# Patient Record
Sex: FEMALE | Race: BLACK OR AFRICAN AMERICAN | Employment: FULL TIME | URBAN - METROPOLITAN AREA
[De-identification: names, ages, dates, MRNs, and addresses within clinical notes are randomized per-mention and may not be internally consistent; named-entity substitution may affect disease eponyms.]

---

## 2018-12-27 ENCOUNTER — COV DENTAL ENCOUNTER (OUTPATIENT)
Dept: CT IMAGING | Age: 28
DRG: 064 | End: 2018-12-27
Attending: NEUROLOGICAL SURGERY
Payer: SUBSIDIZED

## 2018-12-27 ENCOUNTER — APPOINTMENT (OUTPATIENT)
Dept: CT IMAGING | Age: 28
End: 2018-12-27
Attending: NURSE PRACTITIONER
Payer: SUBSIDIZED

## 2018-12-27 ENCOUNTER — HOSPITAL ENCOUNTER (INPATIENT)
Age: 28
LOS: 6 days | Discharge: HOME | DRG: 064 | End: 2019-01-02
Attending: EMERGENCY MEDICINE | Admitting: FAMILY MEDICINE
Payer: SUBSIDIZED

## 2018-12-27 ENCOUNTER — HOSPITAL ENCOUNTER (EMERGENCY)
Age: 28
Discharge: HOSPICE | End: 2018-12-27
Attending: EMERGENCY MEDICINE
Payer: SUBSIDIZED

## 2018-12-27 ENCOUNTER — APPOINTMENT (OUTPATIENT)
Dept: MRI IMAGING | Age: 28
DRG: 064 | End: 2018-12-27
Attending: FAMILY MEDICINE
Payer: SUBSIDIZED

## 2018-12-27 VITALS
BODY MASS INDEX: 31.58 KG/M2 | DIASTOLIC BLOOD PRESSURE: 55 MMHG | TEMPERATURE: 98.8 F | SYSTOLIC BLOOD PRESSURE: 119 MMHG | OXYGEN SATURATION: 99 % | RESPIRATION RATE: 19 BRPM | WEIGHT: 185 LBS | HEART RATE: 65 BPM | HEIGHT: 64 IN

## 2018-12-27 DIAGNOSIS — I62.9 INTRACRANIAL BLEED (HCC): ICD-10-CM

## 2018-12-27 DIAGNOSIS — I61.5: Primary | ICD-10-CM

## 2018-12-27 DIAGNOSIS — I61.9 CEREBRAL BRAIN HEMORRHAGE (HCC): Primary | ICD-10-CM

## 2018-12-27 LAB
ALBUMIN SERPL-MCNC: 3.4 G/DL (ref 3.5–5)
ALBUMIN/GLOB SERPL: 0.9 {RATIO}
ALP SERPL-CCNC: 88 U/L (ref 50–130)
ALT SERPL-CCNC: 121 U/L (ref 12–65)
AMPHET UR QL SCN: NEGATIVE
ANION GAP SERPL CALC-SCNC: 9 MMOL/L
AST SERPL-CCNC: 245 U/L (ref 15–37)
ATRIAL RATE: 58 BPM
BACTERIA URNS QL MICRO: NORMAL /HPF
BARBITURATES UR QL SCN: NEGATIVE
BASOPHILS # BLD: 0 K/UL (ref 0–0.2)
BASOPHILS NFR BLD: 0 % (ref 0–2)
BENZODIAZ UR QL: NEGATIVE
BILIRUB SERPL-MCNC: 0.6 MG/DL (ref 0.2–1.1)
BUN SERPL-MCNC: 9 MG/DL (ref 6–23)
CALCIUM SERPL-MCNC: 8.9 MG/DL (ref 8.3–10.4)
CALCULATED P AXIS, ECG09: 56 DEGREES
CALCULATED R AXIS, ECG10: 69 DEGREES
CALCULATED T AXIS, ECG11: 49 DEGREES
CANNABINOIDS UR QL SCN: POSITIVE
CASTS URNS QL MICRO: 0 /LPF
CHLORIDE SERPL-SCNC: 104 MMOL/L (ref 98–107)
CO2 SERPL-SCNC: 26 MMOL/L (ref 21–32)
COCAINE UR QL SCN: NEGATIVE
CREAT SERPL-MCNC: 0.96 MG/DL (ref 0.6–1)
CRYSTALS URNS QL MICRO: 0 /LPF
DIAGNOSIS, 93000: NORMAL
DIFFERENTIAL METHOD BLD: ABNORMAL
EOSINOPHIL # BLD: 0.1 K/UL (ref 0–0.8)
EOSINOPHIL NFR BLD: 2 % (ref 0.5–7.8)
EPI CELLS #/AREA URNS HPF: NORMAL /HPF
ERYTHROCYTE [DISTWIDTH] IN BLOOD BY AUTOMATED COUNT: 12 % (ref 11.9–14.6)
GLOBULIN SER CALC-MCNC: 3.9 G/DL (ref 2.3–3.5)
GLUCOSE SERPL-MCNC: 86 MG/DL (ref 65–100)
HCG UR QL: NEGATIVE
HCT VFR BLD AUTO: 42 % (ref 35.8–46.3)
HGB BLD-MCNC: 13.9 G/DL (ref 11.7–15.4)
IMM GRANULOCYTES # BLD: 0 K/UL (ref 0–0.5)
IMM GRANULOCYTES NFR BLD AUTO: 0 % (ref 0–5)
LYMPHOCYTES # BLD: 1.1 K/UL (ref 0.5–4.6)
LYMPHOCYTES NFR BLD: 30 % (ref 13–44)
MCH RBC QN AUTO: 30.8 PG (ref 26.1–32.9)
MCHC RBC AUTO-ENTMCNC: 33.1 G/DL (ref 31.4–35)
MCV RBC AUTO: 93.1 FL (ref 79.6–97.8)
METHADONE UR QL: NEGATIVE
MONOCYTES # BLD: 0.3 K/UL (ref 0.1–1.3)
MONOCYTES NFR BLD: 9 % (ref 4–12)
MUCOUS THREADS URNS QL MICRO: 0 /LPF
NEUTS SEG # BLD: 2.1 K/UL (ref 1.7–8.2)
NEUTS SEG NFR BLD: 59 % (ref 43–78)
NRBC # BLD: 0 K/UL (ref 0–0.2)
OPIATES UR QL: NEGATIVE
P-R INTERVAL, ECG05: 144 MS
PCP UR QL: NEGATIVE
PLATELET # BLD AUTO: 175 K/UL (ref 150–450)
PMV BLD AUTO: 10.8 FL (ref 9.4–12.3)
POTASSIUM SERPL-SCNC: 3.6 MMOL/L (ref 3.5–5.1)
PROT SERPL-MCNC: 7.3 G/DL
Q-T INTERVAL, ECG07: 402 MS
QRS DURATION, ECG06: 86 MS
QTC CALCULATION (BEZET), ECG08: 394 MS
RBC # BLD AUTO: 4.51 M/UL (ref 4.05–5.2)
RBC #/AREA URNS HPF: NORMAL /HPF
SODIUM SERPL-SCNC: 139 MMOL/L (ref 136–145)
TRICHOMONAS UR QL MICRO: NORMAL
VENTRICULAR RATE, ECG03: 58 BPM
WBC # BLD AUTO: 3.6 K/UL (ref 4.3–11.1)
WBC URNS QL MICRO: NORMAL /HPF

## 2018-12-27 PROCEDURE — 74011250636 HC RX REV CODE- 250/636: Performed by: NURSE PRACTITIONER

## 2018-12-27 PROCEDURE — 70553 MRI BRAIN STEM W/O & W/DYE: CPT

## 2018-12-27 PROCEDURE — 74011250636 HC RX REV CODE- 250/636: Performed by: EMERGENCY MEDICINE

## 2018-12-27 PROCEDURE — 99285 EMERGENCY DEPT VISIT HI MDM: CPT | Performed by: EMERGENCY MEDICINE

## 2018-12-27 PROCEDURE — 70450 CT HEAD/BRAIN W/O DYE: CPT

## 2018-12-27 PROCEDURE — 85025 COMPLETE CBC W/AUTO DIFF WBC: CPT

## 2018-12-27 PROCEDURE — 96374 THER/PROPH/DIAG INJ IV PUSH: CPT | Performed by: EMERGENCY MEDICINE

## 2018-12-27 PROCEDURE — 77030032490 HC SLV COMPR SCD KNE COVD -B

## 2018-12-27 PROCEDURE — 74011000258 HC RX REV CODE- 258: Performed by: FAMILY MEDICINE

## 2018-12-27 PROCEDURE — 80307 DRUG TEST PRSMV CHEM ANLYZR: CPT

## 2018-12-27 PROCEDURE — A9575 INJ GADOTERATE MEGLUMI 0.1ML: HCPCS | Performed by: EMERGENCY MEDICINE

## 2018-12-27 PROCEDURE — 81015 MICROSCOPIC EXAM OF URINE: CPT

## 2018-12-27 PROCEDURE — 70544 MR ANGIOGRAPHY HEAD W/O DYE: CPT

## 2018-12-27 PROCEDURE — 77030020263 HC SOL INJ SOD CL0.9% LFCR 1000ML

## 2018-12-27 PROCEDURE — 74011250636 HC RX REV CODE- 250/636: Performed by: FAMILY MEDICINE

## 2018-12-27 PROCEDURE — 81003 URINALYSIS AUTO W/O SCOPE: CPT | Performed by: EMERGENCY MEDICINE

## 2018-12-27 PROCEDURE — 93005 ELECTROCARDIOGRAM TRACING: CPT | Performed by: EMERGENCY MEDICINE

## 2018-12-27 PROCEDURE — 65610000001 HC ROOM ICU GENERAL

## 2018-12-27 PROCEDURE — 70496 CT ANGIOGRAPHY HEAD: CPT

## 2018-12-27 PROCEDURE — 77030019605

## 2018-12-27 PROCEDURE — 81025 URINE PREGNANCY TEST: CPT

## 2018-12-27 PROCEDURE — 80053 COMPREHEN METABOLIC PANEL: CPT

## 2018-12-27 RX ORDER — LABETALOL HYDROCHLORIDE 5 MG/ML
10 INJECTION, SOLUTION INTRAVENOUS
Status: DISCONTINUED | OUTPATIENT
Start: 2018-12-27 | End: 2019-01-02 | Stop reason: HOSPADM

## 2018-12-27 RX ORDER — ONDANSETRON 2 MG/ML
4 INJECTION INTRAMUSCULAR; INTRAVENOUS
Status: DISCONTINUED | OUTPATIENT
Start: 2018-12-27 | End: 2019-01-02 | Stop reason: HOSPADM

## 2018-12-27 RX ORDER — GADOTERATE MEGLUMINE 376.9 MG/ML
17 INJECTION INTRAVENOUS
Status: COMPLETED | OUTPATIENT
Start: 2018-12-27 | End: 2018-12-27

## 2018-12-27 RX ORDER — SODIUM CHLORIDE 9 MG/ML
75 INJECTION, SOLUTION INTRAVENOUS CONTINUOUS
Status: DISCONTINUED | OUTPATIENT
Start: 2018-12-27 | End: 2019-01-02 | Stop reason: HOSPADM

## 2018-12-27 RX ORDER — SODIUM CHLORIDE 0.9 % (FLUSH) 0.9 %
10 SYRINGE (ML) INJECTION
Status: COMPLETED | OUTPATIENT
Start: 2018-12-27 | End: 2018-12-27

## 2018-12-27 RX ORDER — DIPHENHYDRAMINE HYDROCHLORIDE 50 MG/ML
12.5 INJECTION, SOLUTION INTRAMUSCULAR; INTRAVENOUS
Status: DISCONTINUED | OUTPATIENT
Start: 2018-12-27 | End: 2018-12-27

## 2018-12-27 RX ORDER — NICARDIPINE HYDROCHLORIDE 0.1 MG/ML
5-15 INJECTION INTRAVENOUS
Status: DISCONTINUED | OUTPATIENT
Start: 2018-12-27 | End: 2018-12-27 | Stop reason: SDUPTHER

## 2018-12-27 RX ORDER — SODIUM CHLORIDE 0.9 % (FLUSH) 0.9 %
5-10 SYRINGE (ML) INJECTION AS NEEDED
Status: DISCONTINUED | OUTPATIENT
Start: 2018-12-27 | End: 2019-01-02 | Stop reason: HOSPADM

## 2018-12-27 RX ORDER — SODIUM CHLORIDE 0.9 % (FLUSH) 0.9 %
5-10 SYRINGE (ML) INJECTION EVERY 8 HOURS
Status: DISCONTINUED | OUTPATIENT
Start: 2018-12-27 | End: 2019-01-02 | Stop reason: HOSPADM

## 2018-12-27 RX ORDER — ACETAMINOPHEN 325 MG/1
650 TABLET ORAL
Status: DISCONTINUED | OUTPATIENT
Start: 2018-12-27 | End: 2019-01-02 | Stop reason: HOSPADM

## 2018-12-27 RX ORDER — METOCLOPRAMIDE HYDROCHLORIDE 5 MG/ML
10 INJECTION INTRAMUSCULAR; INTRAVENOUS
Status: COMPLETED | OUTPATIENT
Start: 2018-12-27 | End: 2018-12-27

## 2018-12-27 RX ORDER — SODIUM CHLORIDE 9 MG/ML
1000 INJECTION, SOLUTION INTRAVENOUS ONCE
Status: COMPLETED | OUTPATIENT
Start: 2018-12-27 | End: 2018-12-27

## 2018-12-27 RX ADMIN — Medication 10 ML: at 17:44

## 2018-12-27 RX ADMIN — SODIUM CHLORIDE 1000 ML: 900 INJECTION, SOLUTION INTRAVENOUS at 12:21

## 2018-12-27 RX ADMIN — GADOTERATE MEGLUMINE 17 ML: 376.9 INJECTION INTRAVENOUS at 17:44

## 2018-12-27 RX ADMIN — SODIUM CHLORIDE 75 ML/HR: 900 INJECTION, SOLUTION INTRAVENOUS at 18:42

## 2018-12-27 RX ADMIN — SODIUM CHLORIDE 500 MG: 900 INJECTION, SOLUTION INTRAVENOUS at 20:21

## 2018-12-27 RX ADMIN — METOCLOPRAMIDE 10 MG: 5 INJECTION, SOLUTION INTRAMUSCULAR; INTRAVENOUS at 12:21

## 2018-12-27 NOTE — ED NOTES
Patient to MRI at this time. Family leaving bedside.  Provided information for contact:    Sindi Gongora, brother  [phone]    Manuel Wilson, mother  [address] [facility], father  [phone]

## 2018-12-27 NOTE — LETTER
[facility] EMERGENCY DEPT One [address] [id number] [phone] Work/School Note Date: 12/27/2018 To Whom It May concern: 
 
Alek Ashton was seen and treated today in the emergency room by the following provider(s): 
Attending Provider: Shemar Hughes MD. Alek Ashton may return to work when cleared by her neurologist.  
 
Sincerely, Maritza Hodge RN

## 2018-12-27 NOTE — ED NOTES
Report called to Khanh Zaldivar RN at [facility] ed. Opportunity for questions provided. Pt will be transferred to [place] [address] ed by [facility] ambulance.

## 2018-12-27 NOTE — ED NOTES
TRANSFER - OUT REPORT:    Verbal report given to [facility], RN(name) on Lizzie Cabrera  being transferred to ICU(unit) for routine progression of care       Report consisted of patients Situation, Background, Assessment and   Recommendations(SBAR). Information from the following report(s) SBAR, ED Summary and Recent Results was reviewed with the receiving nurse. Lines:   Peripheral IV 12/27/18 Left Antecubital (Active)        Opportunity for questions and clarification was provided.

## 2018-12-27 NOTE — CONSULTS
NEUROSURGERY CONSULT NOTE:     CC: Headaches    HPI:   Sanjay Treviño 25 y.o. female with no significant PMH who presents to [address] [place] [address] after being transferred from the [address] [facility]". She has had a persistent headache since Saturday and has not been herself with some associated memory loss. She underwent a CT Head WO contrast that demonstrated a 2.7 x 2.2 cm hemorrhage left lateral ventricle and splenium. She underwent an MRI Brain with and without contrast that demonstrated the same aforementioned hemorrhage measuring 2.2 x 2.8 cm in the left lateral ventricle trigone and left splenium. There is no evidence of ventriculomegaly concerning for acute hydrocephalus. MRV demonstrated no evidence of venous sinus thrombosis.      PMH: No significant PMH   PSH: No significant PSH  No Known Allergies  Social History     Socioeconomic History    Marital status: SINGLE     Spouse name: Not on file    Number of children: Not on file    Years of education: Not on file    Highest education level: Not on file   Social Needs    Financial resource strain: Not on file    Food insecurity - worry: Not on file    Food insecurity - inability: Not on file    Transportation needs - medical: Not on file   [employer] needs - non-medical: Not on file   Occupational History    Not on file   Tobacco Use    Smoking status: Current Every Day Smoker     Years: 0.50    Smokeless tobacco: Never Used   Substance and Sexual Activity    Alcohol use: Yes     Comment: occasional    Drug use: No    Sexual activity: Not on file   Other Topics Concern    Not on file   Social History Narrative    Not on file     Review of Systems - Unable to cooperate with complete review of systems secondary to the patient being in too much pain     Physical Exam:   Visit Vitals  /63   Pulse 65   Temp 98.5 °F (36.9 °C)   Resp 17   Ht 5' 4\" (1.626 m)   Wt 185 lb (83.9 kg)   SpO2 100%   BMI 31.76 kg/m²   GCS 14  General: No acute distress  Awake, alert, and oriented to person, place, time, and situation   Eyes closed but will open to voice. Patient endorse photophobia as her reason for keeping her eyes closed. PERRL, EOMI, left conjunctival injection and hemorrhage  Face symmetric and tongue mid-line on protrusion   No pronation or drift on exam   Patient with strength exam as follows:   Upper Extremities: Right Left      Deltoid  5 5    Biceps  5 5    Triceps 5 5      5 5     Lower Extremities:      Hip Flex 5 5    Quads  5 5    Hamstrings 5 5    Dorsiflex 5 5    Plantarflex 5 5    EHL  5 5  No clonus or babinski present   Gait Deferred       Assessment and Plan:   Yolanda Luis 25 y.o. female who presented with headaches and photophobia since Saturday and was found to have a intraventricular and intraparenchymal hemorrhage arising in the region of the left lateral ventricle atrium and splenium. I have independently reviewed and interpreted the CT Head WO contrast that demonstrated a 2.7 x 2.2 cm hemorrhage left lateral ventricle and splenium,  MRI Brain with and without contrast that demonstrated the same aforementioned hemorrhage measuring 2.2 x 2.8 cm in the left lateral ventricle trigone and left splenium. There is no evidence of ventriculomegaly concerning for acute hydrocephalus. MRV demonstrated no evidence of venous sinus thrombosis. At this time the differential diagnosis includes spontaneous intraventricular intraparenchymal hemorrhage secondary to a vascular malformation or aneurysm, hemorrhagic conversion of an infarct, hypertensive hemorrhage, or hemorrhage within an underlying mass lesion. At this time there is no evidence of coagulopathy as an underlying cause and no evidence of a mass lesion as the lesion could be obscured by the hemorrhage. Given her age and sudden onset the patient needs a STAT CTA Head and Neck to rule out vascular malformation or ruptured aneurysm.  No acute neurosurgical intervention necessary at this time.   - No acute neurosurgical intervention necessary at this time.   - Recommend Strict Blood pressure control   - SBP < 140 mmHg   - No anti-coagulation or anti-platelet medications   - Recommend Keppra 500 mg BID for seizure prophylaxis for seven days   - Recommend q1H neuro checks   - STAT CTA Head and Neck, if positive for aneurysm or vascular malformation will discuss with vascular/endovascular neurosurgery. - Please call with questions or concerns. Delmer Jordan.  Brandon Pena, [facility]

## 2018-12-27 NOTE — PROGRESS NOTES
Patient returned from CT with no issues. Patient remains photophobic with an anterior headache, moving all extremities, and oriented X4. Will continue to monitor.

## 2018-12-27 NOTE — LETTER
[facility] [facility] EMERGENCY DEPT One [address] [address] 
[phone] Work/School Note Date: 12/27/2018 To Whom It May concern: 
 
Doni Asher was seen and treated today in the emergency room by the following provider(s): 
Attending Provider: Richard Ochoa MD. Doni Asher is being admitted for continuation of care. Sincerely, Demetrice Boone RN

## 2018-12-27 NOTE — ED TRIAGE NOTES
Pt to ED via [address] [place] [address] from [facility] ED. Pt has been experiencing headache, light sensitivity, memory loss, and AMS since Saturday. Pt went to  ED today and now has confirmed head bleed. Transferred here for progression of care.

## 2018-12-27 NOTE — ED PROVIDER NOTES
Patient presents with her boyfriend who states patient with headache and \"not acting herself\" since Saturday. Patient's boyfriend states when he left for work on Saturday patient was in the bed sleeping. He states when he returned that night patient has was in the bed complaining of a left sided headache, nausea, and vomiting. Patient's boyfriend states patient told him she fell and hit her head on the table \"sometime\" on Saturday. He states he was not home when patient fell. Patient's boyfriend states patient has also \"bit her tongue at sometime\" but he is unsure of when this happened also. Patient states photophobia also. She is unable to recall fall. The history is provided by the patient. History reviewed. No pertinent past medical history. History reviewed. No pertinent surgical history. History reviewed. No pertinent family history. Social History     Socioeconomic History    Marital status: SINGLE     Spouse name: Not on file    Number of children: Not on file    Years of education: Not on file    Highest education level: Not on file   Social Needs    Financial resource strain: Not on file    Food insecurity - worry: Not on file    Food insecurity - inability: Not on file    Transportation needs - medical: Not on file   [employer] needs - non-medical: Not on file   Occupational History    Not on file   Tobacco Use    Smoking status: Current Every Day Smoker     Years: 0.50    Smokeless tobacco: Never Used   Substance and Sexual Activity    Alcohol use: Yes     Comment: occasional    Drug use: No    Sexual activity: Not on file   Other Topics Concern    Not on file   Social History Narrative    Not on file         ALLERGIES: Patient has no known allergies. Review of Systems   HENT: Positive for mouth sores. Eyes: Positive for photophobia, discharge and redness. Gastrointestinal: Positive for nausea and vomiting. Neurological: Positive for headaches. Vitals:    12/27/18 1124   BP: 110/72   Pulse: 65   Resp: 19   Temp: 98.8 °F (37.1 °C)   SpO2: 97%   Weight: 83.9 kg (185 lb)   Height: 5' 4\" (1.626 m)            Physical Exam   Constitutional: She is oriented to person, place, and time. No distress. HENT:   Right Ear: Tympanic membrane normal.   Left Ear: No mastoid tenderness. Tympanic membrane is erythematous. Mouth/Throat: Oral lesions present. Posterior oropharyngeal erythema present. Eyes: Left eye exhibits discharge. Left conjunctiva is injected. Left eye exhibits no nystagmus. Left pupil is round and reactive. Neck: Full passive range of motion without pain. No spinous process tenderness and no muscular tenderness present. Cardiovascular: Normal rate and regular rhythm. Pulmonary/Chest: Effort normal and breath sounds normal.   Musculoskeletal:        Left hand: Decreased strength noted. Decrease  strength on left side. Decrease strength noted in left side. Neurological: She is alert and oriented to person, place, and time. GCS eye subscore is 4. GCS verbal subscore is 5. GCS motor subscore is 6. Skin: Skin is warm and dry. She is not diaphoretic. Psychiatric: She has a normal mood and affect. Her behavior is normal.   Nursing note and vitals reviewed. 1:04 PM-discussed CT results with Dr. Marsha Kelsey. Neurosurgery paged. 1:12 PM-spoke with nurse working with Dr. Karishma Luciano. Dr. Karishma Luciano will review patient's chart and return call. 1:18 PM-spoke with Dr. Karishma Luciano who has requested patient be transferred to the  ED for his evaluation. Discussed patient with Dr. Priyanka Courtney at  ED.     Recent Results (from the past 12 hour(s))   CBC WITH AUTOMATED DIFF    Collection Time: 12/27/18 12:20 PM   Result Value Ref Range    WBC 3.6 (L) 4.3 - 11.1 K/uL    RBC 4.51 4.05 - 5.2 M/uL    HGB 13.9 11.7 - 15.4 g/dL    HCT 42.0 35.8 - 46.3 %    MCV 93.1 79.6 - 97.8 FL    MCH 30.8 26.1 - 32.9 PG    MCHC 33.1 31.4 - 35.0 g/dL    RDW 12.0 11.9 - 14.6 % PLATELET 140 767 - 340 K/uL    MPV 10.8 9.4 - 12.3 FL    ABSOLUTE NRBC 0.00 0.0 - 0.2 K/uL    DF AUTOMATED      NEUTROPHILS 59 43 - 78 %    LYMPHOCYTES 30 13 - 44 %    MONOCYTES 9 4.0 - 12.0 %    EOSINOPHILS 2 0.5 - 7.8 %    BASOPHILS 0 0.0 - 2.0 %    IMMATURE GRANULOCYTES 0 0.0 - 5.0 %    ABS. NEUTROPHILS 2.1 1.7 - 8.2 K/UL    ABS. LYMPHOCYTES 1.1 0.5 - 4.6 K/UL    ABS. MONOCYTES 0.3 0.1 - 1.3 K/UL    ABS. EOSINOPHILS 0.1 0.0 - 0.8 K/UL    ABS. BASOPHILS 0.0 0.0 - 0.2 K/UL    ABS. IMM. GRANS. 0.0 0.0 - 0.5 K/UL   METABOLIC PANEL, COMPREHENSIVE    Collection Time: 12/27/18 12:20 PM   Result Value Ref Range    Sodium 139 136 - 145 mmol/L    Potassium 3.6 3.5 - 5.1 mmol/L    Chloride 104 98 - 107 mmol/L    CO2 26 21 - 32 mmol/L    Anion gap 9 mmol/L    Glucose 86 65 - 100 mg/dL    BUN 9 6 - 23 MG/DL    Creatinine 0.96 0.6 - 1.0 MG/DL    GFR est AA >60 >60 ml/min/1.73m2    GFR est non-AA >60 ml/min/1.73m2    Calcium 8.9 8.3 - 10.4 MG/DL    Bilirubin, total 0.6 0.2 - 1.1 MG/DL    ALT (SGPT) 121 (H) 12 - 65 U/L    AST (SGOT) 245 (H) 15 - 37 U/L    Alk.  phosphatase 88 50 - 130 U/L    Protein, total 7.3 g/dL    Albumin 3.4 (L) 3.5 - 5.0 g/dL    Globulin 3.9 (H) 2.3 - 3.5 g/dL    A-G Ratio 0.9     DRUG SCREEN, URINE    Collection Time: 12/27/18 12:35 PM   Result Value Ref Range    PCP(PHENCYCLIDINE) NEGATIVE       BENZODIAZEPINES NEGATIVE       COCAINE NEGATIVE       AMPHETAMINES NEGATIVE       METHADONE NEGATIVE       THC (TH-CANNABINOL) POSITIVE      OPIATES NEGATIVE       BARBITURATES NEGATIVE      HCG URINE, QL. - POC    Collection Time: 12/27/18 12:40 PM   Result Value Ref Range    Pregnancy test,urine (POC) NEGATIVE  NEG       Ct Head Wo Cont    Result Date: 12/27/2018  EXAMINATION: HEAD CT WITHOUT CONTRAST 12/27/2018 12:53 PM ACCESSION NUMBER: [id number] INDICATION: Headache, acute, severe, thunderclap, worst HA of life COMPARISON: None available TECHNIQUE: Multiple-row detector helical CT examination of the head without intravenous contrast. Radiation dose reduction techniques were used for this study:  Our CT scanners use one or all of the following: Automated exposure control, adjustment of the mA and/or kVp according to patient's size, iterative reconstruction. FINDINGS: 2.7 x 2.2 cm hemorrhage centered within the posterior body and posterior horn of the left lateral ventricle. Hemorrhagic products extend into the white matter abutting the ventricle, with surrounding vasogenic edema. There is no associated midline shift. The basilar cisterns remain patent. There is no evidence of acute ischemic infarction. The paranasal sinuses and mastoid air cells are well aerated and clear. IMPRESSION: 2.7 x 2.2 cm acute hematoma centered in the body and posterior horn of the left lateral ventricle and extending into the adjacent white matter with surrounding vasogenic edema. In a patient of this age, there are variety of possible etiologies. In particular, an underlying mass must be evaluated for, including MRI follow-up until complete resolution of hemorrhage. Although this would be an atypical location for hemorrhage associated with dural venous sinus thrombosis, correlation for the history of a hypercoagulable state (including oral contraceptive use) is recommended. When able, further evaluation with a brain MRI with and without intravenous contrast and consideration towards an MR venogram are recommended. Discussed with Marjorie Trevino by Dr. Olga Blackmon at 12:58 PM 12/27/2018. VOICE DICTATED BY: Dr. Lachelle Romo   . MDM  Number of Diagnoses or Management Options  Hemorrhage into ventricle [facility]):   Diagnosis management comments: CT head positive for left ventricle hemorrhage. Discussed patient with Dr. Jordana Pappas. Per Dr. Jordana Pappas patient is to be transferred to [facility] ED. Patient discussed with Dr. Fred Sher [facility]. Discussed with Dr. Katiuska Cuenca.         Amount and/or Complexity of Data Reviewed  Clinical lab tests: ordered  Tests in the radiology section of CPT®: ordered and reviewed  Discuss the patient with other providers: yes (Lori Wilder. )    Patient Progress  Patient progress: stable         Procedures

## 2018-12-27 NOTE — PROGRESS NOTES
TRANSFER - IN REPORT:    Verbal report received from Pam(name) on [place]  being received from ED(unit) for routine progression of care      Report consisted of patients Situation, Background, Assessment and   Recommendations(SBAR). Information from the following report(s) ED Summary was reviewed with the receiving nurse. Opportunity for questions and clarification was provided. Assessment completed upon patients arrival to unit and care assumed.

## 2018-12-27 NOTE — ED TRIAGE NOTES
Pt in with boyfriend c/o left side headache since Saturday. States attempted tylenol without relief. States nausea and vomiting. Denies history of migraines. States difficulty focusing.

## 2018-12-27 NOTE — ED NOTES
Spoke with radiologist over in [address]  Informed that they are ready for MRI to begin at this time, radiologist OK with screening to be performed over there prior to exam beginning. Pt denies hx claustrophobia.

## 2018-12-27 NOTE — PROGRESS NOTES
Patient received to ICU and Dr. Hernan Cannon came to bedside to evaluate patient. Patient to go to a CTA at this time. Radiologist called MRI results to Dr. Hernan Cannon. Patient can move all extremities and answer orientation questions. Patient complains of lights hurting her eyes and keeps them closed. When pupils were assessed they were reactive and L eye's sclera is reddened. VSS. Patient denies any open wounds to skin and none were visualized. Patient turns self in bed.

## 2018-12-27 NOTE — ED NOTES
Per Jose Antonio Lima NP, requesting patient in a room. Pt moved from hallway into room 8. Urine specimen obtained. Report given to Mayco Ortega RN.

## 2018-12-27 NOTE — ED NOTES
Patient with boyfriend of 6 years at bedside states that he came home from work on 12/22 and found a \"table messed up like she fell\" and that patient had put his shoes in kitchen sink and a deck of cards in refrigerator. Patient able to answers questions correctly, slow to answer president however does not remember any events since 12/22.

## 2018-12-27 NOTE — PROGRESS NOTES
Spoke to - Neurosurgeon- recommended to order MRI brain with and with out contrast.Family was notified about the same.

## 2018-12-27 NOTE — H&P
Hospitalist H&P Note     Admit Date:  2018  1:47 PM   Name:  Michelle Andrew   Age:  25 y.o.  :  1990   MRN:  [id number]   PCP:  Unknown, Provider  Treatment Team: Attending Provider: Leticia Hess MD  Headache,slow answering,? confusion  HPI:   History from boyfriend and family at bedside. All the symptoms started since 16. According to boyfriend- pt  Once reached home from work- Saturday evening- found pt in bed sleeping, arousable, c/o left sided headache. Since then pt had been having headache persistent every day. Since  family/boyfriend noticed that pt would talk a bit slow, at times memory problems. - but still was able to do day to day activity. Last night boyfriend discussed with pt - pan was to go to hosital/pcp this am.This morning- when boyfriend woke up- he noticed that pt was already awake , sitting at the side of the bed- c/o headache. Decided to come to hospital for further evaluation. Initially to [facility] and then transferred to [facility] for neurosurgical evaluation- intracranial bleed. Pt and family otherwise didn't c/o new fever or sob or dizziness or abdominal pain or chest pain. Known h/o smoking and social drinking. H/o Drug abuse- according to [facility] several years ago- nothing recently. No h/o using oral contraceptives. alt- 121,ast- 245. Urine drug screen positive for THC. Ct head-2.7 x 2.2 cm acute hematoma centered in the body and posterior horn of the left  lateral ventricle and extending into the adjacent white matter with surrounding  vasogenic edema. Pt will be admitted to ICU for intracranial bleed.     10 systems reviewed and negative except as noted in HPI.  medical history - nil  surgical history - nil  No Known Allergies   Social History     Tobacco Use    Smoking status: Current Every Day Smoker     Years: 0.50    Smokeless tobacco: Never Used   Substance Use Topics    Alcohol use: Yes     Comment: occasional       family history- h/o blood clots   There is no immunization history for the selected administration types on file for this patient. PTA Medications:  None       Objective:     Patient Vitals for the past 24 hrs:   Temp Pulse Resp BP SpO2   12/27/18 1554 98.5 °F (36.9 °C) 65 17 119/63 100 %   12/27/18 1545  63 19 119/63 98 %   12/27/18 1531  66 18 127/57 99 %   12/27/18 1501  63 15 119/75 99 %   12/27/18 1431  66  112/58 98 %   12/27/18 1416  (!) 57 22 110/57 100 %   12/27/18 1355  (!) 59 18 117/69 100 %   12/27/18 1349  66 16 109/74 99 %     Oxygen Therapy  O2 Sat (%): 100 % (12/27/18 1554)  Pulse via Oximetry: 63 beats per minute (12/27/18 1554)  O2 Device: Room air (12/27/18 1352)  No intake or output data in the 24 hours ending 12/27/18 1641    Physical Exam:  General:    Well nourished. Alert. C/o photophobia- has bed sheath covering her head. Eyes:   Normal sclera. Extraocular movements intact. left eye conjunctival injection. Difficult examination secodnary to photophobia  ENT:  Normocephalic, atraumatic. Moist mucous membranes  CV:   RRR. No murmur, rub, or gallop. Lungs:  CTAB. No wheezing, rhonchi, or rales. Abdomen: Soft, nontender, nondistended. Bowel sounds normal.   Extremities: Warm and dry. No cyanosis or edema. Neurologic: CN II-XII grossly intact. Sensation intact. power normal all extremities. Plantars down going. normal reflexes. Skin:     No rashes or jaundice. Psych:  Normal mood and affect. I reviewed the labs, imaging, EKGs, telemetry, and other studies done this admission.   Data Review:   Recent Results (from the past 24 hour(s))   CBC WITH AUTOMATED DIFF    Collection Time: 12/27/18 12:20 PM   Result Value Ref Range    WBC 3.6 (L) 4.3 - 11.1 K/uL    RBC 4.51 4.05 - 5.2 M/uL    HGB 13.9 11.7 - 15.4 g/dL    HCT 42.0 35.8 - 46.3 %    MCV 93.1 79.6 - 97.8 FL    MCH 30.8 26.1 - 32.9 PG    MCHC 33.1 31.4 - 35.0 g/dL    RDW 12.0 11.9 - 14.6 %    PLATELET 175 150 - 450 K/uL    MPV 10.8 9.4 - 12.3 FL    ABSOLUTE NRBC 0.00 0.0 - 0.2 K/uL    DF AUTOMATED      NEUTROPHILS 59 43 - 78 %    LYMPHOCYTES 30 13 - 44 %    MONOCYTES 9 4.0 - 12.0 %    EOSINOPHILS 2 0.5 - 7.8 %    BASOPHILS 0 0.0 - 2.0 %    IMMATURE GRANULOCYTES 0 0.0 - 5.0 %    ABS. NEUTROPHILS 2.1 1.7 - 8.2 K/UL    ABS. LYMPHOCYTES 1.1 0.5 - 4.6 K/UL    ABS. MONOCYTES 0.3 0.1 - 1.3 K/UL    ABS. EOSINOPHILS 0.1 0.0 - 0.8 K/UL    ABS. BASOPHILS 0.0 0.0 - 0.2 K/UL    ABS. IMM. GRANS. 0.0 0.0 - 0.5 K/UL   METABOLIC PANEL, COMPREHENSIVE    Collection Time: 12/27/18 12:20 PM   Result Value Ref Range    Sodium 139 136 - 145 mmol/L    Potassium 3.6 3.5 - 5.1 mmol/L    Chloride 104 98 - 107 mmol/L    CO2 26 21 - 32 mmol/L    Anion gap 9 mmol/L    Glucose 86 65 - 100 mg/dL    BUN 9 6 - 23 MG/DL    Creatinine 0.96 0.6 - 1.0 MG/DL    GFR est AA >60 >60 ml/min/1.73m2    GFR est non-AA >60 ml/min/1.73m2    Calcium 8.9 8.3 - 10.4 MG/DL    Bilirubin, total 0.6 0.2 - 1.1 MG/DL    ALT (SGPT) 121 (H) 12 - 65 U/L    AST (SGOT) 245 (H) 15 - 37 U/L    Alk.  phosphatase 88 50 - 130 U/L    Protein, total 7.3 g/dL    Albumin 3.4 (L) 3.5 - 5.0 g/dL    Globulin 3.9 (H) 2.3 - 3.5 g/dL    A-G Ratio 0.9     DRUG SCREEN, URINE    Collection Time: 12/27/18 12:35 PM   Result Value Ref Range    PCP(PHENCYCLIDINE) NEGATIVE       BENZODIAZEPINES NEGATIVE       COCAINE NEGATIVE       AMPHETAMINES NEGATIVE       METHADONE NEGATIVE       THC (TH-CANNABINOL) POSITIVE      OPIATES NEGATIVE       BARBITURATES NEGATIVE      URINE MICROSCOPIC    Collection Time: 12/27/18 12:35 PM   Result Value Ref Range    WBC 3-5 0 /hpf    RBC 3-5 0 /hpf    Epithelial cells 0-3 0 /hpf    Bacteria TRACE 0 /hpf    Casts 0 0 /lpf    Crystals, urine 0 0 /LPF    Mucus 0 0 /lpf    Trichomonas OCCASIONAL     HCG URINE, QL. - POC    Collection Time: 12/27/18 12:40 PM   Result Value Ref Range    Pregnancy test,urine (POC) NEGATIVE  NEG     EKG, 12 LEAD, INITIAL    Collection Time: 12/27/18  1:53 PM   Result Value Ref Range    Ventricular Rate 58 BPM    Atrial Rate 58 BPM    P-R Interval 144 ms    QRS Duration 86 ms    Q-T Interval 402 ms    QTC Calculation (Bezet) 394 ms    Calculated P Axis 56 degrees    Calculated R Axis 69 degrees    Calculated T Axis 49 degrees    Diagnosis       !! AGE AND GENDER SPECIFIC ECG ANALYSIS !! Sinus bradycardia  Cannot rule out Anterior infarct , age undetermined  Abnormal ECG  No previous ECGs available  Confirmed by Bk Crum MD (), SHELL PINTO ([id number]) on 12/27/2018 3:27:40 PM         All Micro Results     None          Other Studies:  Ct Head Wo Cont    Result Date: 12/27/2018  EXAMINATION: HEAD CT WITHOUT CONTRAST 12/27/2018 12:53 PM ACCESSION NUMBER: [id number] INDICATION: Headache, acute, severe, thunderclap, worst HA of life COMPARISON: None available TECHNIQUE: Multiple-row detector helical CT examination of the head without intravenous contrast. Radiation dose reduction techniques were used for this study:  Our CT scanners use one or all of the following: Automated exposure control, adjustment of the mA and/or kVp according to patient's size, iterative reconstruction. FINDINGS: 2.7 x 2.2 cm hemorrhage centered within the posterior body and posterior horn of the left lateral ventricle. Hemorrhagic products extend into the white matter abutting the ventricle, with surrounding vasogenic edema. There is no associated midline shift. The basilar cisterns remain patent. There is no evidence of acute ischemic infarction. The paranasal sinuses and mastoid air cells are well aerated and clear. IMPRESSION: 2.7 x 2.2 cm acute hematoma centered in the body and posterior horn of the left lateral ventricle and extending into the adjacent white matter with surrounding vasogenic edema. In a patient of this age, there are variety of possible etiologies.  In particular, an underlying mass must be evaluated for, including MRI follow-up until complete resolution of hemorrhage. Although this would be an atypical location for hemorrhage associated with dural venous sinus thrombosis, correlation for the history of a hypercoagulable state (including oral contraceptive use) is recommended. When able, further evaluation with a brain MRI with and without intravenous contrast and consideration towards an MR venogram are recommended. Discussed with Edda Montoya by Dr. Brennan Rowley at 12:58 PM 12/27/2018. VOICE DICTATED BY: Dr. Vaughn Ground and Plan:     Hospital Problems as of 12/27/2018 Never Reviewed          Codes Class Noted - Resolved POA    Intracranial bleed ([address] ICD-10-CM: I62.9  ICD-9-CM: 432.9  12/27/2018 - Present Unknown              PLAN:  Intracranial bleeding/photophobia-? Etiology- spoke to Neurosurgery- recommended MRI brain with  And without contrast.Ok to order MRV Brain as per radiology recommendations. Discussed with family, boyfriend- no questions unanswered. Guarded prognosis. Pt is full code. According to the ER staff pt passed bedside swallow.     DVT ppx:  scd  Anticipated DC needs:    Code status:  Full  Estimated LOS:  Greater than 2 midnights  Risk:  high    Signed:  Tildon Cogan, MD

## 2018-12-28 ENCOUNTER — APPOINTMENT (OUTPATIENT)
Dept: CT IMAGING | Age: 28
DRG: 064 | End: 2018-12-28
Attending: FAMILY MEDICINE
Payer: SUBSIDIZED

## 2018-12-28 LAB
ALBUMIN SERPL-MCNC: 2.9 G/DL (ref 3.5–5)
ALBUMIN/GLOB SERPL: 0.8 {RATIO} (ref 1.2–3.5)
ALP SERPL-CCNC: 75 U/L (ref 50–136)
ALT SERPL-CCNC: 97 U/L (ref 12–65)
ANION GAP SERPL CALC-SCNC: 7 MMOL/L (ref 7–16)
AST SERPL-CCNC: 144 U/L (ref 15–37)
BASOPHILS # BLD: 0 K/UL (ref 0–0.2)
BASOPHILS NFR BLD: 0 % (ref 0–2)
BILIRUB SERPL-MCNC: 0.6 MG/DL (ref 0.2–1.1)
BUN SERPL-MCNC: 10 MG/DL (ref 6–23)
CALCIUM SERPL-MCNC: 8.6 MG/DL (ref 8.3–10.4)
CHLORIDE SERPL-SCNC: 108 MMOL/L (ref 98–107)
CO2 SERPL-SCNC: 27 MMOL/L (ref 21–32)
CREAT SERPL-MCNC: 0.84 MG/DL (ref 0.6–1)
DIFFERENTIAL METHOD BLD: ABNORMAL
EOSINOPHIL # BLD: 0 K/UL (ref 0–0.8)
EOSINOPHIL NFR BLD: 1 % (ref 0.5–7.8)
ERYTHROCYTE [DISTWIDTH] IN BLOOD BY AUTOMATED COUNT: 11.9 % (ref 11.9–14.6)
GLOBULIN SER CALC-MCNC: 3.8 G/DL (ref 2.3–3.5)
GLUCOSE SERPL-MCNC: 86 MG/DL (ref 65–100)
HCT VFR BLD AUTO: 38.1 % (ref 35.8–46.3)
HGB BLD-MCNC: 12.7 G/DL (ref 11.7–15.4)
IMM GRANULOCYTES # BLD: 0 K/UL (ref 0–0.5)
IMM GRANULOCYTES NFR BLD AUTO: 0 % (ref 0–5)
LYMPHOCYTES # BLD: 1.1 K/UL (ref 0.5–4.6)
LYMPHOCYTES NFR BLD: 34 % (ref 13–44)
MCH RBC QN AUTO: 31.3 PG (ref 26.1–32.9)
MCHC RBC AUTO-ENTMCNC: 33.3 G/DL (ref 31.4–35)
MCV RBC AUTO: 93.8 FL (ref 79.6–97.8)
MONOCYTES # BLD: 0.3 K/UL (ref 0.1–1.3)
MONOCYTES NFR BLD: 10 % (ref 4–12)
NEUTS SEG # BLD: 1.7 K/UL (ref 1.7–8.2)
NEUTS SEG NFR BLD: 54 % (ref 43–78)
NRBC # BLD: 0 K/UL (ref 0–0.2)
PLATELET # BLD AUTO: 172 K/UL (ref 150–450)
PMV BLD AUTO: 11 FL (ref 9.4–12.3)
POTASSIUM SERPL-SCNC: 3.6 MMOL/L (ref 3.5–5.1)
PROT SERPL-MCNC: 6.7 G/DL (ref 6.3–8.2)
RBC # BLD AUTO: 4.06 M/UL (ref 4.05–5.2)
SODIUM SERPL-SCNC: 142 MMOL/L (ref 136–145)
WBC # BLD AUTO: 3.2 K/UL (ref 4.3–11.1)

## 2018-12-28 PROCEDURE — 74011250636 HC RX REV CODE- 250/636: Performed by: FAMILY MEDICINE

## 2018-12-28 PROCEDURE — 99252 IP/OBS CONSLTJ NEW/EST SF 35: CPT | Performed by: PHYSICAL MEDICINE & REHABILITATION

## 2018-12-28 PROCEDURE — 70486 CT MAXILLOFACIAL W/O DYE: CPT

## 2018-12-28 PROCEDURE — 77030020263 HC SOL INJ SOD CL0.9% LFCR 1000ML

## 2018-12-28 PROCEDURE — 74011000258 HC RX REV CODE- 258: Performed by: FAMILY MEDICINE

## 2018-12-28 PROCEDURE — 92610 EVALUATE SWALLOWING FUNCTION: CPT

## 2018-12-28 PROCEDURE — 74011250637 HC RX REV CODE- 250/637: Performed by: INTERNAL MEDICINE

## 2018-12-28 PROCEDURE — 36415 COLL VENOUS BLD VENIPUNCTURE: CPT

## 2018-12-28 PROCEDURE — 85025 COMPLETE CBC W/AUTO DIFF WBC: CPT

## 2018-12-28 PROCEDURE — 80053 COMPREHEN METABOLIC PANEL: CPT

## 2018-12-28 PROCEDURE — 65610000001 HC ROOM ICU GENERAL

## 2018-12-28 RX ADMIN — ACETAMINOPHEN 650 MG: 325 TABLET, FILM COATED ORAL at 20:38

## 2018-12-28 RX ADMIN — SODIUM CHLORIDE 75 ML/HR: 900 INJECTION, SOLUTION INTRAVENOUS at 13:16

## 2018-12-28 RX ADMIN — Medication 10 ML: at 04:54

## 2018-12-28 RX ADMIN — Medication 10 ML: at 20:41

## 2018-12-28 RX ADMIN — SODIUM CHLORIDE 500 MG: 900 INJECTION, SOLUTION INTRAVENOUS at 07:18

## 2018-12-28 RX ADMIN — Medication 10 ML: at 14:00

## 2018-12-28 RX ADMIN — SODIUM CHLORIDE 500 MG: 900 INJECTION, SOLUTION INTRAVENOUS at 20:40

## 2018-12-28 NOTE — CONSULTS
PM&R Rehab Consult    Subjective:     Date of Consultation:  December 28, 2018    Referring Physician: Dr. Marifer Baltazar  Consultant; Dr Dora Murillo of Neurosurgery    Patient is a 25 y.o. female who is being seen for a physiatry consultation under the stroke protocol after admission with a spontaneous ICH    Active Problems:    Intracranial bleed ([address] (12/27/2018)    HPI; Ms Florencia Regalado is a previously functionally independent, right hand dominant, 28yo AAF with no PMH x tobacco and marijuana use, who presented to [facility] ED with headache, n/v and AMS which, per her boyfriend, began on 12/22. She c/o photophobia. HCT revealed an IVH and ICH in the region of the left lateral ventricle atrium and splenium. She was transferred to [facility] for further w/u and treatment. There was no obvious underlying vascular or neoplastic abnormalities noted. The bleed measured 2.7x 2.2 cm. MRI with and w/o contrast again demonstrated the hemorrhage. There was no evidence of ventriculomegaly or concern for acute hydrocephalus. Minor Ronde MRV demonstrated no evidence of venous sinus thrombosis. She was seen in consult by Dr Dora Murillo of NS who felt that no acute intervention was needed. She was started on Keppra for sz prophylaxis x 7d, as well as neuro check q1hr and admission to the ICU. A CTA head and neck was performed and per radiology, there was increased enhancement within the right temporal lobe with what appears to be both arterial and venous components and cannot totally exclude a vascular malformation in this region . This is not definitive evidence of such. A repeat Head Ct was ordered this a.m. Physical therapy attempted eval this a.m but pt was experiencing N/V and ongoing photophobia. No past medical history on file. No family history on file.    Social History     Tobacco Use    Smoking status: Current Every Day Smoker     Years: 0.50    Smokeless tobacco: Never Used   Substance Use Topics    Alcohol use: Yes     Comment: occasional   premorbidly functionally independent. + tob and marijuana use. Denies alcohol or illicit drug use. No past surgical history on file. Prior to Admission medications    Not on File     No Known Allergies     Review of Systems:  Review of systems not obtained due to patient factors. Objective:     Vitals:  Blood pressure 106/69, pulse (!) 59, temperature 98.5 °F (36.9 °C), resp. rate 18, height 5' 4\" (1.626 m), weight 185 lb (83.9 kg), SpO2 100 %. Temp (24hrs), Av.7 °F (37.1 °C), Min:98.5 °F (36.9 °C), Max:98.9 °F (37.2 °C)      Intake and Output:   1901 -  0700  In: 630 [I.V.:630]  Out: 200 [Urine:200]    Physical Exam:  General:  Alert, oriented ; squints when she opens eyes. Left conjunctiva injected  ORAL; post oropharyngeal erythema, tongue lesions notes   Lungs:   Clear to auscultation bilaterally. Heart:  Regular rate and rhythm, S1, S2 stable, no murmur, click, rub or gallop. Abdomen:   Soft, non-tender. Bowel sounds present. No masses,  No organomegaly. Genitourinary: defer   Neuro Muscular: Non focal motor exam, Irma equally. Poor effort on MMT;  is symmetrical. fcs and answers all questions  Toes down on plantar stimulation  Reflexes symm; CN 2-12 intact; no nystagmus  Sensation intact throughout  Voice quiet   Skin:  No rashes, lesions, or signs/symptoms or infection.  No edema       Labs/Studies:  Recent Results (from the past 72 hour(s))   CBC WITH AUTOMATED DIFF    Collection Time: 18 12:20 PM   Result Value Ref Range    WBC 3.6 (L) 4.3 - 11.1 K/uL    RBC 4.51 4.05 - 5.2 M/uL    HGB 13.9 11.7 - 15.4 g/dL    HCT 42.0 35.8 - 46.3 %    MCV 93.1 79.6 - 97.8 FL    MCH 30.8 26.1 - 32.9 PG    MCHC 33.1 31.4 - 35.0 g/dL    RDW 12.0 11.9 - 14.6 %    PLATELET 936 711 - 480 K/uL    MPV 10.8 9.4 - 12.3 FL    ABSOLUTE NRBC 0.00 0.0 - 0.2 K/uL    DF AUTOMATED      NEUTROPHILS 59 43 - 78 %    LYMPHOCYTES 30 13 - 44 %    MONOCYTES 9 4.0 - 12.0 %    EOSINOPHILS 2 0.5 - 7.8 %    BASOPHILS 0 0.0 - 2.0 %    IMMATURE GRANULOCYTES 0 0.0 - 5.0 %    ABS. NEUTROPHILS 2.1 1.7 - 8.2 K/UL    ABS. LYMPHOCYTES 1.1 0.5 - 4.6 K/UL    ABS. MONOCYTES 0.3 0.1 - 1.3 K/UL    ABS. EOSINOPHILS 0.1 0.0 - 0.8 K/UL    ABS. BASOPHILS 0.0 0.0 - 0.2 K/UL    ABS. IMM. GRANS. 0.0 0.0 - 0.5 K/UL   METABOLIC PANEL, COMPREHENSIVE    Collection Time: 12/27/18 12:20 PM   Result Value Ref Range    Sodium 139 136 - 145 mmol/L    Potassium 3.6 3.5 - 5.1 mmol/L    Chloride 104 98 - 107 mmol/L    CO2 26 21 - 32 mmol/L    Anion gap 9 mmol/L    Glucose 86 65 - 100 mg/dL    BUN 9 6 - 23 MG/DL    Creatinine 0.96 0.6 - 1.0 MG/DL    GFR est AA >60 >60 ml/min/1.73m2    GFR est non-AA >60 ml/min/1.73m2    Calcium 8.9 8.3 - 10.4 MG/DL    Bilirubin, total 0.6 0.2 - 1.1 MG/DL    ALT (SGPT) 121 (H) 12 - 65 U/L    AST (SGOT) 245 (H) 15 - 37 U/L    Alk.  phosphatase 88 50 - 130 U/L    Protein, total 7.3 g/dL    Albumin 3.4 (L) 3.5 - 5.0 g/dL    Globulin 3.9 (H) 2.3 - 3.5 g/dL    A-G Ratio 0.9     DRUG SCREEN, URINE    Collection Time: 12/27/18 12:35 PM   Result Value Ref Range    PCP(PHENCYCLIDINE) NEGATIVE       BENZODIAZEPINES NEGATIVE       COCAINE NEGATIVE       AMPHETAMINES NEGATIVE       METHADONE NEGATIVE       THC (TH-CANNABINOL) POSITIVE      OPIATES NEGATIVE       BARBITURATES NEGATIVE      URINE MICROSCOPIC    Collection Time: 12/27/18 12:35 PM   Result Value Ref Range    WBC 3-5 0 /hpf    RBC 3-5 0 /hpf    Epithelial cells 0-3 0 /hpf    Bacteria TRACE 0 /hpf    Casts 0 0 /lpf    Crystals, urine 0 0 /LPF    Mucus 0 0 /lpf    Trichomonas OCCASIONAL     HCG URINE, QL. - POC    Collection Time: 12/27/18 12:40 PM   Result Value Ref Range    Pregnancy test,urine (POC) NEGATIVE  NEG     EKG, 12 LEAD, INITIAL    Collection Time: 12/27/18  1:53 PM   Result Value Ref Range    Ventricular Rate 58 BPM    Atrial Rate 58 BPM    P-R Interval 144 ms    QRS Duration 86 ms    Q-T Interval 402 ms    QTC Calculation (Bezet) 394 ms    Calculated P Axis 56 degrees Calculated R Axis 69 degrees    Calculated T Axis 49 degrees    Diagnosis       !! AGE AND GENDER SPECIFIC ECG ANALYSIS !! Sinus bradycardia  Cannot rule out Anterior infarct , age undetermined  Abnormal ECG  No previous ECGs available  Confirmed by Sina Juan MD (), SHELL PINTO ([id number]) on 12/27/2018 0:46:93 PM     METABOLIC PANEL, COMPREHENSIVE    Collection Time: 12/28/18  3:43 AM   Result Value Ref Range    Sodium 142 136 - 145 mmol/L    Potassium 3.6 3.5 - 5.1 mmol/L    Chloride 108 (H) 98 - 107 mmol/L    CO2 27 21 - 32 mmol/L    Anion gap 7 7 - 16 mmol/L    Glucose 86 65 - 100 mg/dL    BUN 10 6 - 23 MG/DL    Creatinine 0.84 0.6 - 1.0 MG/DL    GFR est AA >60 >60 ml/min/1.73m2    GFR est non-AA >60 >60 ml/min/1.73m2    Calcium 8.6 8.3 - 10.4 MG/DL    Bilirubin, total 0.6 0.2 - 1.1 MG/DL    ALT (SGPT) 97 (H) 12 - 65 U/L    AST (SGOT) 144 (H) 15 - 37 U/L    Alk. phosphatase 75 50 - 136 U/L    Protein, total 6.7 6.3 - 8.2 g/dL    Albumin 2.9 (L) 3.5 - 5.0 g/dL    Globulin 3.8 (H) 2.3 - 3.5 g/dL    A-G Ratio 0.8 (L) 1.2 - 3.5     CBC WITH AUTOMATED DIFF    Collection Time: 12/28/18  3:43 AM   Result Value Ref Range    WBC 3.2 (L) 4.3 - 11.1 K/uL    RBC 4.06 4.05 - 5.2 M/uL    HGB 12.7 11.7 - 15.4 g/dL    HCT 38.1 35.8 - 46.3 %    MCV 93.8 79.6 - 97.8 FL    MCH 31.3 26.1 - 32.9 PG    MCHC 33.3 31.4 - 35.0 g/dL    RDW 11.9 11.9 - 14.6 %    PLATELET 494 991 - 770 K/uL    MPV 11.0 9.4 - 12.3 FL    ABSOLUTE NRBC 0.00 0.0 - 0.2 K/uL    DF AUTOMATED      NEUTROPHILS 54 43 - 78 %    LYMPHOCYTES 34 13 - 44 %    MONOCYTES 10 4.0 - 12.0 %    EOSINOPHILS 1 0.5 - 7.8 %    BASOPHILS 0 0.0 - 2.0 %    IMMATURE GRANULOCYTES 0 0.0 - 5.0 %    ABS. NEUTROPHILS 1.7 1.7 - 8.2 K/UL    ABS. LYMPHOCYTES 1.1 0.5 - 4.6 K/UL    ABS. MONOCYTES 0.3 0.1 - 1.3 K/UL    ABS. EOSINOPHILS 0.0 0.0 - 0.8 K/UL    ABS. BASOPHILS 0.0 0.0 - 0.2 K/UL    ABS. IMM.  GRANS. 0.0 0.0 - 0.5 K/UL         Speech Assessment:    Dysphagia Screening  Vocal Quality/Secretions: Normal  History of Dysphagia: No  O2 Saturation: Normal  Alertness: Normal  Pre-Swallow Assessment Score: 0  Purees: No difficulty noted  Water by Cup: No difficulty noted  Water by Straw: No difficulty noted  Aspiration Signs/Symptoms: None             Ambulation:  Activity and Safety  Activity Level: Bed Rest  Ambulate: No (Comment)  Activity: In bed, Family/Visitors present  Activity Assistance: Partial (one person)  Weight Bearing Status: WBAT (Weight Bearing as Tolerated)  Mode of Transportation: Stretcher  Repositioned: Head of bed elevated (degrees)  Patient Turned: Turns self  Head of Bed Elevated: Self regulated  Assistive Device: Fall prevention device  Safety Measures: Bed/Chair alarm on, Bed/Chair-Wheels locked, Bed in low position, Call light within reach, Fall prevention (comment), Family at bedside, Nurse at bedside, Visitors at bedside, Video Monitoring for Safety     Impression/Plan:     Active Problems:    Intracranial bleed ([facility] [address] (12/27/2018)     Left hemispheric ICH/IVH; currently neuro intact    Recommendations: Continue Acute Rehab Program  Coordination of rehab/medical care  Counseling of PM & R care issues management  Monitoring and management of medical conditions per plan of care/orders   -will continue to follow with you. Currently neuro intact. Some mild executive cognitive dysfunction but may be secondary to discomfort and decreased attn  -PT/OT pending. ST ongoing.   -will f/u Mond for further recommendations. Overall , rehab potential is excellent.   -will need f/u MRI once hemorrhage resolved to look for underlying mass lesion. No vascular malformation definitively noted.   Discussion with pt/Staff  Reviewed Therapies/Labs/Meds/Records  Thank you  Signed By:  Linda Nichols MD     December 28, 2018

## 2018-12-28 NOTE — PROGRESS NOTES
Occupational Therapy Note: 
 
Per PT note \"Orders received and patient discussed at interdisciplinary ICU rounds. Patient currently experiencing nausea/vomiting and light sensitivity\". RN recommends holding OT this AM. Will attempt another day as patient is able. Thank you, Velma Bowman, OT

## 2018-12-28 NOTE — PROGRESS NOTES
LTG: Patient will tolerate least restrictive diet without overt signs or symptoms of airway compromise. STG: Patient will tolerate full liquid diet without overt signs or symptoms of airway compromise. STG: Patient will participate trials of chewable textures to assess for diet advancement Speech language pathology: bedside swallow note: Initial Assessment NAME/AGE/GENDER: Katlyn Alejandro is a 25 y.o. female DATE: 12/28/2018 PRIMARY DIAGNOSIS: Intracranial bleed ([facility] [address] ICD-10: Treatment Diagnosis: R13.11 Oral Dysphagia. INTERDISCIPLINARY COLLABORATION: Registered Nurse PRECAUTIONS/ALLERGIES: Patient has no known allergies. ASSESSMENT:Based on the objective data described below, Ms. Nahomy Bain presents with oral dysphagia secondary to oral pain with swallow. Bilateral lingual lesions noted, as well as patient reporting sensation of \"swollen tongue\". She reports 8/10 lingual pain, especially with po intake. She is oriented x4 and follows commands appropriately; however, all responses are delayed. Patient agreeable to thin liquid trials only secondary to pain. Propulsion appears delayed, likely related to oral pain. Improved tolerance of thin liquids when drinking from straw. Timely swallow initiation. Vocal quality remained clear. No overt s/s of airway compromise with liquid intake. Recommend initiate full liquid diet. Anticipate ability to advance diet as lingual pain resolves. Speech to follow for po trials and diet tolerance. Also anticipate need for full speech-language/cognitive assessment due to confusion at baseline and delayed responses in session. Patient will benefit from skilled intervention to address the below impairments. ?????? ? ? This section established at most recent assessment?????????? 
PROBLEM LIST (Impairments causing functional limitations): 1. Oral dysphagia REHABILITATION POTENTIAL FOR STATED GOALS: Good PLAN OF CARE:  
 Patient will benefit from skilled intervention to address the following impairments. RECOMMENDATIONS AND PLANNED INTERVENTIONS (Benefits and precautions of therapy have been discussed with the patient.): 
· Liquids:  regular thin MEDICATIONS: 
· With liquid · Crushed in puree ASPIRATION PRECAUTIONS: 
· Slow rate of intake · Small bites/sips · Upright at 90 degrees during meal 
COMPENSATORY STRATEGIES/MODIFICATIONS INCLUDING: 
· None OTHER RECOMMENDATIONS (including follow up treatment recommendations): · Family training/education · Patient education RECOMMENDED DIET MODIFICATIONS DISCUSSED WITH: 
· Nursing · Patient FREQUENCY/DURATION: Continue to follow patient 3 times a week for duration of hospital stay to address above goals. RECOMMENDED REHABILITATION/EQUIPMENT: (at time of discharge pending progress): Due to the probability of continued deficits (see above) this patient will likely need continued skilled speech therapy after discharge. SUBJECTIVE:  
Eyes closed during session, but patient awake. Oriented x4. Delayed responses History of Present Injury/Illness: Ms. Gracie Mayorga  has no past medical history on file. .  She also  has no past surgical history on file. Present Symptoms: oral dysphagia Pain Scale 1: Numeric (0 - 10) Pain Intensity 1: 8 Pain Location 1: Mouth Pain Intervention(s) 1: Nurse notified Current Medications:  
Current Facility-Administered Medications on File Prior to Encounter Medication Dose Route Frequency Provider Last Rate Last Dose  [COMPLETED] 0.9% sodium chloride infusion 1,000 mL  1,000 mL IntraVENous ONCE JENNIE Ramires   Stopped at 12/27/18 1327  
 [COMPLETED] metoclopramide HCl (REGLAN) injection 10 mg  10 mg IntraVENous NOW JENNIE Ramires   10 mg at 12/27/18 1221  [DISCONTINUED] diphenhydrAMINE (BENADRYL) injection 12.5 mg  12.5 mg IntraVENous NOW JENNIE Nesbitt      
 
 No current outpatient medications on file prior to encounter. Current Dietary Status: NPO OBJECTIVE:  
Respiratory Status:  Room air Oral Motor Structure/Speech:  Oral-Motor Structure/Motor Speech Labial: Decreased rate, Decreased seal, Right droop Dentition: Intact Oral Hygiene: Bilateral lingual lesions Lingual: Decreased rate, Decreased strength Cognitive and Communication Status: 
Neurologic State: Eyes open to voice Orientation Level: Oriented X4 Cognition: Follows commands Perception: Appears intact Perseveration: No perseveration noted Safety/Judgement: Home safety BEDSIDE SWALLOW EVALUATIONOral Assessment: 
Oral Assessment Labial: Decreased rate;Decreased seal;Right droop Dentition: Intact Oral Hygiene: Bilateral lingual lesions Lingual: Decreased rate;Decreased strength P.O. Trials: 
Patient Position: Upright in bed The patient was given tsp-small bite amounts of the following:  
Consistency Presented: Thin liquid How Presented: Self-fed/presented;Cup/sip;Straw;Successive swallows ORAL PHASE: 
  
Bolus Formation/Control: No impairment Propulsion: Delayed (# of seconds) Oral Residue: None PHARYNGEAL PHASE: 
Initiation of Swallow: No impairment Laryngeal Elevation: Functional 
Aspiration Signs/Symptoms: None Vocal Quality: Low volume Pharyngeal Phase Characteristics: Painful swallow OTHER OBSERVATIONS: 
Rate/bite size: Impaired Endurance:  Impaired Comments: Tool Used: Dysphagia Outcome and Severity Scale (JULIA) Score Comments Normal Diet  [] 7 With no strategies or extra time needed Functional Swallow  [] 6 May have mild oral or pharyngeal delay Mild Dysphagia [x] 5 Which may require one diet consistency restricted (those who demonstrate penetration which is entirely cleared on MBS would be included) Mild-Moderate Dysphagia  [] 4 With 1-2 diet consistencies restricted Moderate Dysphagia  [] 3 With 2 or more diet consistencies restricted Moderately Severe Dysphagia  [] 2 With partial PO strategies (trials with ST only) Severe Dysphagia  [] 1 With inability to tolerate any PO safely Score:  Initial: 5 Most Recent: X (Date: -- ) Interpretation of Tool: The Dysphagia Outcome and Severity Scale (JULIA) is a simple, easy-to-use, 7-point scale developed to systematically rate the functional severity of dysphagia based on objective assessment and make recommendations for diet level, independence level, and type of nutrition. Score 7 6 5 4 3 2 1 Modifier CH CI CJ CK CL CM CN ? Swallowing:  
  - CURRENT STATUS: CJ - 20%-39% impaired, limited or restricted  - GOAL STATUS:  CH - 0% impaired, limited or restricted  - D/C STATUS:  ---------------To be determined--------------- Payor: SELF PAY / Plan: [facility] SELF PAY / Product Type: Self Pay /  
 
TREATMENT:  
 (In addition to Assessment/Re-Assessment sessions the following treatments were rendered) Assessment/Reassessment only, no treatment provided today MODALITIES:  
  
  
  
  
  
  
  
  
  
  
    
  
  
  
  
  
  
  
  
   
 
ORAL MOTOR  EXERCISES: 
  
  
  
  
  
  
  
  
  
  
  
  
  
  
  
  
  
  
  
  
  
  
  
  
  
  
    
  
  
  
  
  
  
  
  
  
  
  
  
  
  
  
  
  
  
  
  
  
  
  
  
  
   
 
LARYNGEAL / PHARYNGEAL EXERCISES: 
  
  
  
  
  
  
  
  
  
  
  
  
  
  
  
  
  
  
  
  
  
    
  
  
  
  
  
  
  
  
  
  
  
  
  
  
  
  
  
  
  
   
 
__________________________________________________________________________________________________ Safety: After treatment position/precautions: · Upright in Bed. Progression/Medical Necessity:  
· Patient is expected to demonstrate progress in swallow function, diet tolerance and swallow safety to improve swallow safety and work toward diet advancement. Compliance with Program/Exercises: Will assess as treatment progresses Reason for Continuation of Services/Other Comments: 
· Patient continues to require skilled intervention due to oral dysphagia. Recommendations/Intent for next treatment session: \"Treatment next visit will focus on diet tolerance, po trials for diet advancement\". Total Treatment Duration: 
Time In: 6732 Time Out: [address] CCC-SLP

## 2018-12-28 NOTE — INTERDISCIPLINARY ROUNDS
Interdisciplinary team rounds were held 12/28/2018 with the following team members:Care Management, Nursing, Nurse Practitioner, Nutrition, Occupational Therapy, Palliative Care, Pastoral Care, Pharmacy, Physical Therapy, Physician, [de-identified] Assistant, Respiratory Therapy, Speech Therapy and Clinical Coordinator and the patient. Plan of care discussed. See clinical pathway and/or care plan for interventions and desired outcomes.
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
## 2018-12-28 NOTE — PROGRESS NOTES
MD notified of CTA results. Orders to hold off on repeat head CT today. No other new orders received. Will continue to monitor pt.

## 2018-12-28 NOTE — PROGRESS NOTES
Physical Therapy Note: 
 
Orders received and patient discussed at interdisciplinary ICU rounds. Patient currently experiencing nausea/vomiting and light sensitivity. RN recommends holding PT this AM. Will attempt another day as patient is able. Thank you, FRED VogtT

## 2018-12-28 NOTE — PROGRESS NOTES
Care Management Interventions PCP Verified by CM: (No PCP reported, will need [facility] follow up) Transition of Care Consult (CM Consult): Discharge Planning(Pt is uninsured at this time. Lives with her boyfriend who is employed. [employer] is to follow up for possible coverage and/or financial assistance.  ) Physical Therapy Consult: Yes Occupational Therapy Consult: Yes Speech Therapy Consult: Yes Current Support Network: Other, Family Lives Nearby(Lives with boyfriend. She has local supportive family. Normally independent with ADl's.) Confirm Follow Up Transport: Family Plan discussed with Pt/Family/Caregiver: Yes Freedom of Choice Offered: Yes [employer] Information Provided?: No 
Discharge Location Discharge Placement: Unable to determine at this time(Await PT/OT evals for rehab recommendations. Physiatrist following pt for possible acute rehab admission her at [facility].  Pt/family will need to work with [facility] about fianacial assistance and medical coverage options.  ) ---

## 2018-12-28 NOTE — CDMP QUERY
25 yr old female patient coming in with new severe headache. Ct of the brain showing \"2.7 x 2.2 cm acute hematoma centered in the body and posterior horn of the left lateral ventricle and extending into the adjacent white matter with surrounding vasogenic edema. \" Please clarify if you agree with CT findings-- 
----vasogenic edema is present  
----vasogenic edema present but not significant this admission 
----vasogenic edema not present  
=>Other Explanation of clinical findings =>Unable to Determine (no explanation of clinical findings) The medical record reflects the following: 
 
Risk Factors: birth control Clinical Indicators: as mentioned on CT Treatment: keppra, antihypertensives, cardene drip Neurosurgery consult Please clarify and document your clinical opinion in the progress notes and discharge summary including the definitive and/or presumptive diagnosis, (suspected or probable), related to the above clinical findings. Please include clinical findings supporting your diagnosis. Thanks, Richelle Gilbert RN, CDS Compliant Documentation Management Program 
[phone]

## 2018-12-28 NOTE — PROGRESS NOTES
Received bedside report from [place], RN. Pt in bed resting quietly. Dual neuro assessment complete -a/o x4. NIH = 0 . Pt is experiencing extreme light sensitivity and pain/trauma to tongue - along with n/v. Redness of the L eye present. Dual skin assessment complete. O2 sats in the 90s via RA. Lung sounds clear bilaterally. Pulses palpable and present in all extremities. Abdomen intact with active bowel sounds. Pt voids with assistance but has not voided during this shift yet. VS stable. Will continue to monitor pt.

## 2018-12-28 NOTE — CONSULTS
LEAPFROG PROTOCOL NOTE    Quan Garcia  12/28/2018    The patient is currently in the critical care setting managed by Dr. Trevor Ly and Dr. Timoteo Sher with intracranial bleed. The patient's chart is reviewed and the patient is discussed with the staff. Patient is currently hemodynamically stable. Patient has no needs identified for Intensivist management in the critical care setting at this time. Please notify us if can be of assistance. No charge billed to the patient. Thank you.     Greta Jack NP

## 2018-12-28 NOTE — PROGRESS NOTES
Problem: Falls - Risk of  Goal: *Absence of Falls  Document Laine Fall Risk and appropriate interventions in the flowsheet.   Outcome: Progressing Towards Goal  Fall Risk Interventions:            Medication Interventions: Assess postural VS orthostatic hypotension, Bed/chair exit alarm    Elimination Interventions: Bed/chair exit alarm, Call light in reach    History of Falls Interventions: Bed/chair exit alarm, Room close to nurse's station
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
## 2018-12-28 NOTE — PROGRESS NOTES
NEUROSURGERY PROGRESS NOTE:  
Admit Date: 12/27/2018 Subjective: No acute overnight events. Objective: 
Visit Vitals /62 Pulse (!) 56 Temp 98.7 °F (37.1 °C) Resp 18 Ht 5' 4\" (1.626 m) Wt 185 lb (83.9 kg) SpO2 98% BMI 31.76 kg/m² General: No acute distress Awake, and answers orientation question, oriented to person, place, time, and situation Eyes closed secondary to photophobia will intermittently open on command PERRL, EOMI, left sclera injection Face symmetric and tongue mid-line on protrusion Patient with 5/5 strength in the bilateral upper and bilateral lower extremities Assessment and Plan:  
Marilee Ly 25 y.o. female who presented with headaches and photophobia since Saturday and was found to have a intraventricular and intraparenchymal hemorrhage arising in the region of the left lateral ventricle atrium and splenium. At this time the imaging has not demonstrated definitive evidence of underlying vascular malformation or neoplastic process responsible for the hemorrhage. The patient's neurological examination has not changed since presentation. Will continue to monitor closely with [facility] neuro checks to monitor for development of hydrocephalus. Please obtain a repeat CT Head without contrast today. - No acute neurosurgical intervention necessary at this time.  
- Recommend Strict Blood pressure control  
- SBP < 140 mmHg  
- No anti-coagulation or anti-platelet medications - Recommend Keppra 500 mg BID for seizure prophylaxis for seven days - Recommend q1H neuro checks - Please call with questions or concerns. 
  
Latisha Saavedra [facility]
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
## 2018-12-29 ENCOUNTER — APPOINTMENT (OUTPATIENT)
Dept: CT IMAGING | Age: 28
DRG: 064 | End: 2018-12-29
Attending: FAMILY MEDICINE
Payer: SUBSIDIZED

## 2018-12-29 PROCEDURE — 74011250637 HC RX REV CODE- 250/637: Performed by: FAMILY MEDICINE

## 2018-12-29 PROCEDURE — 65610000001 HC ROOM ICU GENERAL

## 2018-12-29 PROCEDURE — 70450 CT HEAD/BRAIN W/O DYE: CPT

## 2018-12-29 PROCEDURE — 74011000258 HC RX REV CODE- 258: Performed by: FAMILY MEDICINE

## 2018-12-29 PROCEDURE — 74011250636 HC RX REV CODE- 250/636: Performed by: FAMILY MEDICINE

## 2018-12-29 PROCEDURE — 97161 PT EVAL LOW COMPLEX 20 MIN: CPT

## 2018-12-29 PROCEDURE — 97166 OT EVAL MOD COMPLEX 45 MIN: CPT

## 2018-12-29 PROCEDURE — 77030020263 HC SOL INJ SOD CL0.9% LFCR 1000ML

## 2018-12-29 RX ORDER — HYDROCODONE BITARTRATE AND ACETAMINOPHEN 5; 325 MG/1; MG/1
1 TABLET ORAL
Status: DISCONTINUED | OUTPATIENT
Start: 2018-12-29 | End: 2019-01-02 | Stop reason: HOSPADM

## 2018-12-29 RX ADMIN — Medication 10 ML: at 06:23

## 2018-12-29 RX ADMIN — SODIUM CHLORIDE 500 MG: 900 INJECTION, SOLUTION INTRAVENOUS at 07:56

## 2018-12-29 RX ADMIN — HYDROCODONE BITARTRATE AND ACETAMINOPHEN 1 TABLET: 5; 325 TABLET ORAL at 18:27

## 2018-12-29 RX ADMIN — Medication 10 ML: at 14:56

## 2018-12-29 RX ADMIN — SODIUM CHLORIDE 75 ML/HR: 900 INJECTION, SOLUTION INTRAVENOUS at 06:23

## 2018-12-29 RX ADMIN — HYDROCODONE BITARTRATE AND ACETAMINOPHEN 1 TABLET: 5; 325 TABLET ORAL at 09:25

## 2018-12-29 RX ADMIN — Medication 10 ML: at 23:31

## 2018-12-29 RX ADMIN — SODIUM CHLORIDE 500 MG: 900 INJECTION, SOLUTION INTRAVENOUS at 18:13

## 2018-12-29 RX ADMIN — SODIUM CHLORIDE 75 ML/HR: 900 INJECTION, SOLUTION INTRAVENOUS at 23:32

## 2018-12-29 NOTE — PROGRESS NOTES
Problem: Self Care Deficits Care Plan (Adult) Goal: *Acute Goals and Plan of Care (Insert Text) 1. Patient will complete upper body bathing and dressing with mod I and adaptive equipment as needed. 2. Patient will complete lower body bathing and dressing with CGA, additional time, and adaptive equipment as needed. 3. Patient will complete toileting with mod I.  
4. Patient will tolerate 25 minutes of OT treatment with 2 rest breaks to increase activity tolerance for ADLs. 5. Patient will complete functional transfers with mod I and adaptive equipment as needed. 6. Patient will safely retrieve 3 items off the floor in preparation for home management. 7. Patient will complete simple cognitive money management tasks with 80% accuracy. Timeframe: 7 visits OCCUPATIONAL THERAPY: Initial Assessment 12/29/2018INPATIENT: Hospital Day: 3 Payor: SELF PAY / Plan: [facility] SELF PAY / Product Type: Self Pay /  
  
NAME/AGE/GENDER: Zoie Esquivel is a 25 y.o. female PRIMARY DIAGNOSIS:  Intracranial bleed (HCC) <principal problem not specified> <principal problem not specified> 
 
  
ICD-10: Treatment Diagnosis:  
 · Generalized Muscle Weakness (M62.81) · Other lack of cordination (R27.8) · Difficulty in walking, Not elsewhere classified (R26.2) Precautions/Allergies: 
  falls, vision changes, Patient has no known allergies. ASSESSMENT:  
Ms. Pasquale Mart is a drowsy 24 YO R dominant AAF admitted with above and is seen in ICU for OT evaluation. Pt agreeable and RN cleared pt, but just reported that pt is very sensitive to light and is asking for lights to remain off. Pt is A&O X 4 with reports of 5/10 pain at tongue, but was able to maksim orajel to her tongue once placed on her right finger. Pt is slow with all verbal responses and to following commands. Pt also with complaints of light sensitivity and noted L eye is bloodshot.   Bed mobility was SBA to CGA once she decided to try moving, sitting balance good. B UEs are WFLs for basic self care tasks and age. Pt reports she lives with her boyfriend in 1 level apartment on ground level. Pt was independent with gait and ADLs, no DME. 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 Reports no falls in last 6 months. Pt with some memory loss on details of work specifics and what she was able to do prior to bleed. Pt is slower to respond throughout evaluation but attributes this to tongue pain and not difficulty with word finding. Sit to stand CGA HHA and checked vision while standing. Pt was able to read a card on the wall in ICU for ideal body weights at about 18 inches away but did seem to favor her L eye tilting her head to the right and down to view and read with L eye in an upward gaze. Reports she normally wears reading glasses. Able to manipulate her cell phone without trouble. Pt left with PT for further testing. Pt left with all needs met and within reach. Kasey Wallaceist is functioning below her baseline and will benefit from skilled OT (medically necessary) to address decreased strength, decreased balance, decreased functional tolerance, decreased cardiopulmonary endurance affecting participation in basic ADLs and functional tasks. This section established at most recent assessment PROBLEM LIST (Impairments causing functional limitations): 1. Decreased Strength 2. Decreased ADL/Functional Activities 3. Decreased Transfer Abilities 4. Decreased Ambulation Ability/Technique 5. Decreased Balance 6. Increased Pain 7. Decreased Activity Tolerance 8. Increased Fatigue 9. Decreased Cognition 10. decreased vision INTERVENTIONS PLANNED: (Benefits and precautions of occupational therapy have been discussed with the patient.) 1. Activities of daily living training 2. Adaptive equipment training 3. Balance training 4. Clothing management 5. Cognitive training 6. Community reintergration 7. Donning&doffing training 8. Group therapy 9. Neuromuscular re-eduation 10. Therapeutic activity 11. Therapeutic exercise 12. Vision therapy TREATMENT PLAN: Frequency/Duration: Follow patient 3x per week to address above goals. Rehabilitation Potential For Stated Goals: Good RECOMMENDED REHABILITATION/EQUIPMENT: (at time of discharge pending progress): Due to the probability of continued deficits (see above) this patient will likely need continued skilled occupational therapy after discharge. Equipment: ? TBD based on progress OCCUPATIONAL PROFILE AND HISTORY:  
History of Present Injury/Illness (Reason for Referral): 
See H & P Past Medical History/Comorbidities: Ms. Pio Agarwal  has no past medical history on file. Ms. Pio Agarwal  has no past surgical history on file. Social History/Living Environment:  
Home Environment: Apartment # Steps to Enter: 0 One/Two Story Residence: One story Living Alone: No 
Support Systems: Spouse/Significant Other/Partner Patient Expects to be Discharged to[de-identified] Unknown Current DME Used/Available at Home: None Tub or Shower Type: Tub/Shower combination Prior Level of Function/Work/Activity: 
Pt reports she lives with her boyfriend in 1 level apartment on ground level. Pt was independent with gait and ADLs, no DME. 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 Reports no falls in last 6 months. Pt with some memory loss on details of work specifics and what she was able to do prior to bleed. Pt is slower to respond throughout evaluation but attributes this to tongue pain and not difficulty with word finding. Dominant Side:  
      RIGHT Number of Personal Factors/Comorbidities that affect the Plan of Care: Extensive review of physical, cognitive, and psychosocial performance (3+):  HIGH COMPLEXITY ASSESSMENT OF OCCUPATIONAL PERFORMANCE[de-identified]  
 Activities of Daily Living:  
Basic ADLs (From Assessment) Complex ADLs (From Assessment) Feeding: Setup Oral Facial Hygiene/Grooming: Minimum assistance Bathing: Moderate assistance Upper Body Dressing: Minimum assistance Lower Body Dressing: Moderate assistance Toileting: Moderate assistance Instrumental ADL Meal Preparation: Moderate assistance Homemaking: Moderate assistance Medication Management: Maximum assistance Financial Management: Maximum assistance Grooming/Bathing/Dressing Activities of Daily Living Cognitive Retraining Safety/Judgement: Awareness of environment; Fall prevention; Insight into deficits Functional Transfers Bathroom Mobility: Contact guard assistance Toilet Transfer : Minimum assistance(from lower surface) Tub Transfer: Maximum assistance Shower Transfer: Minimum assistance Bed/Mat Mobility Rolling: Stand-by assistance Supine to Sit: Stand-by assistance Sit to Supine: Stand-by assistance Sit to Stand: Contact guard assistance Bed to Chair: Contact guard assistance Scooting: Stand-by assistance Most Recent Physical Functioning:  
Gross Assessment: 
AROM: Within functional limits Strength: Generally decreased, functional 
Coordination: Generally decreased, functional 
Sensation: Intact Posture: 
  
Balance: 
Sitting: Intact; Without support Standing: Intact; With support Bed Mobility: 
Rolling: Stand-by assistance Supine to Sit: Stand-by assistance Sit to Supine: Stand-by assistance Scooting: Stand-by assistance Wheelchair Mobility: 
  
Transfers: 
Sit to Stand: Contact guard assistance Stand to Sit: Contact guard assistance Bed to Chair: Contact guard assistance Patient Vitals for the past 6 hrs: 
 BP BP Patient Position SpO2 Pulse 12/29/18 0600 104/50  97 % 62  
12/29/18 0700 115/58  99 % 91  
12/29/18 0754 115/58 At rest 99 % 61  
12/29/18 0801 111/59  99 % 60  
12/29/18 0905   97 % 60 12/29/18 0906 132/58  97 % (!) 56  
12/29/18 1000 104/58  98 % (!) 58 Mental Status Neurologic State: Drowsy Orientation Level: Oriented X4 Cognition: Appropriate for age attention/concentration, Memory loss, Follows commands Perception: Appears intact Perseveration: No perseveration noted Safety/Judgement: Awareness of environment, Fall prevention, Insight into deficits Physical Skills Involved: 1. Range of Motion 2. Balance 3. Strength 4. Activity Tolerance 5. Vision 6. Pain (acute) 7. Edema 8. Skin Integrity Cognitive Skills Affected (resulting in the inability to perform in a timely and safe manner): 1. Perception 2. Executive Function 3. Immediate Memory 4. Short Term Recall 5. Sustained Attention 6. Divided Attention 7. Comprehension Psychosocial Skills Affected: 1. Habits/Routines 2. Environmental Adaptation 3. Emotional Regulation 4. Self-Awareness 5. Awareness of Others Number of elements that affect the Plan of Care: 5+:  HIGH COMPLEXITY CLINICAL DECISION MAKING:  
[facility] AM-PAC 6 Clicks Daily Activity Inpatient Short Form How much help from another person does the patient currently need. .. Total A Lot A Little None 1. Putting on and taking off regular lower body clothing? [] 1   [x] 2   [] 3   [] 4  
2. Bathing (including washing, rinsing, drying)? [] 1   [x] 2   [] 3   [] 4  
3. Toileting, which includes using toilet, bedpan or urinal?   [] 1   [x] 2   [] 3   [] 4  
4. Putting on and taking off regular upper body clothing? [] 1   [] 2   [x] 3   [] 4  
5. Taking care of personal grooming such as brushing teeth? [] 1   [] 2   [x] 3   [] 4  
6. Eating meals? [] 1   [] 2   [] 3   [x] 4  
© 2007, Trustees of [facility], under license to [employer]. All rights reserved Score:  Initial: 16, CK, completed 12/29/2018 Most Recent: X (Date: -- ) Interpretation of Tool:  Represents activities that are increasingly more difficult (i.e. Bed mobility, Transfers, Gait). Score 24 23 22-20 19-15 14-10 9-7 6 Modifier CH CI CJ CK CL CM CN   
 
? Self Care:  
  - CURRENT STATUS: CK - 40%-59% impaired, limited or restricted  - GOAL STATUS: CI - 1%-19% impaired, limited or restricted  - D/C STATUS:  ---------------To be determined--------------- Payor: SELF PAY / Plan: [facility] SELF PAY / Product Type: Self Pay /   
 
Medical Necessity:    
· Patient demonstrates good rehab potential due to higher previous functional level. Reason for Services/Other Comments: 
· Patient continues to require skilled intervention due to decreased ADLs and mobility. Use of outcome tool(s) and clinical judgement create a POC that gives a: MODERATE COMPLEXITY  
 
 
 
TREATMENT:  
(In addition to Assessment/Re-Assessment sessions the following treatments were rendered) Pre-treatment Symptoms/Complaints:  \"I am not really sure what I did at work. \" 
Pain: Initial:  
Pain Intensity 1: 5 Pain Location 1: Mouth(tongue) Pain Intervention(s) 1: Ambulation/Increased Activity, Distraction, Emotional support, Nurse notified, Repositioned, Rest(pt applied orajel to tongue)  Post Session:  No further complaint of pain except with light exposure Assessment/Reassessment only, no treatment provided today Braces/Orthotics/Lines/Etc:  
· IV 
· full ICU monitoring · O2 Device: Room air Treatment/Session Assessment:   
· Response to Treatment:  No adverse reaction · Interdisciplinary Collaboration:  
o Physical Therapist 
o Occupational Therapist 
o Registered Nurse · After treatment position/precautions:  
o left with PT  
· Compliance with Program/Exercises: Will assess as treatment progresses. · Recommendations/Intent for next treatment session: \"Next visit will focus on advancements to more challenging activities and reduction in assistance provided\". Total Treatment Duration:  10 mins OT Patient Time In/Time Out Time In: 8571 Time Out: 1048 Rodena Fill, OT  Rodena Fill, MS, OTR/L

## 2018-12-29 NOTE — PROGRESS NOTES
Problem: Falls - Risk of 
Goal: *Absence of Falls Document Hamp Oz Fall Risk and appropriate interventions in the flowsheet. Outcome: Progressing Towards Goal 
Fall Risk Interventions: 
  
 
  
 
Medication Interventions: Assess postural VS orthostatic hypotension, Patient to call before getting OOB, Bed/chair exit alarm Elimination Interventions: Bed/chair exit alarm, Call light in reach, Patient to call for help with toileting needs History of Falls Interventions: Bed/chair exit alarm Problem: Pressure Injury - Risk of 
Goal: *Prevention of pressure injury Document Henry Scale and appropriate interventions in the flowsheet. Outcome: Progressing Towards Goal 
Pressure Injury Interventions: Activity Interventions: Assess need for specialty bed, Increase time out of bed, Pressure redistribution bed/mattress(bed type) Nutrition Interventions: Document food/fluid/supplement intake, Offer support with meals,snacks and hydration

## 2018-12-29 NOTE — PROGRESS NOTES
NEUROSURGERY PROGRESS NOTE:  
Admit Date: 12/27/2018 Subjective: No acute overnight events. No new changes per nursing Objective: 
General: No acute distress Awake, and answers orientation question, oriented to person, place, time, and situation Eyes closed secondary to photophobia will intermittently open on command PERRL, EOMI, left sclera injection Face symmetric and tongue mid-line on protrusion Patient with 5/5 strength in the bilateral upper and bilateral lower extremities Assessment and Plan:  
Maxine Flanagan 25 y.o. female who presented with headaches and photophobia since Saturday and was found to have a intraventricular and intraparenchymal hemorrhage arising in the region of the left lateral ventricle atrium and splenium. At this time the imaging has not demonstrated definitive evidence of underlying vascular malformation or neoplastic process responsible for the hemorrhage. The patient's neurological examination has not changed since presentation. Will continue to monitor with q4 neuro checks to monitor for development of hydrocephalus.  
 
- No acute neurosurgical intervention necessary at this time.  
- Recommend continued ICU observation give lethargy - Will need repeat MRI at some point

## 2018-12-29 NOTE — PROGRESS NOTES
Patient was up to [facility] alone with family. Has been told multiple times to get a staff member when she needs to go to the bathroom. Reiterated at this time.

## 2018-12-29 NOTE — PROGRESS NOTES
Problem: Mobility Impaired (Adult and Pediatric) Goal: *Acute Goals and Plan of Care (Insert Text) STG: 
(1.)Ms. Wilson will move from supine to sit and sit to supine  with MODIFIED INDEPENDENCE within 3 treatment day(s). (2.)Ms. Felipe Ho will transfer from bed to chair and chair to bed with SUPERVISION using the least restrictive device within 3 treatment day(s). (3.)Ms. Wilson will ambulate with CONTACT GUARD ASSIST for 250 feet with the least restrictive device within 3 treatment day(s). LTG: 
(1.)Ms. Felipe Ho will move from supine to sit and sit to supine  in bed with INDEPENDENT within 7 treatment day(s). (2.)Ms. Felipe Ho will transfer from bed to chair and chair to bed with INDEPENDENT using the least restrictive device within 7 treatment day(s). (3.)Ms. Wilson will ambulate with INDEPENDENT for 500+ feet with the least restrictive device within 7 treatment day(s). ________________________________________________________________________________________________ PHYSICAL THERAPY: Initial Assessment, Treatment Day: Day of Assessment, AM 12/29/2018INPATIENT: Hospital Day: 3 Payor: SELF PAY / Plan: [facility] SELF PAY / Product Type: Self Pay /  
  
NAME/AGE/GENDER: Ioana Martins is a 25 y.o. female PRIMARY DIAGNOSIS: Intracranial bleed (HCC) <principal problem not specified> <principal problem not specified> 
 
  
ICD-10: Treatment Diagnosis:  
 · Generalized Muscle Weakness (M62.81) · Other lack of cordination (R27.8) · Difficulty in walking, Not elsewhere classified (R26.2) Precaution/Allergies: 
Patient has no known allergies. ASSESSMENT:  
Ms. Felipe Ho is sitting EOB upon contact and agreeable to PT evaluation this morning after finishing working with OT. Pt is A&O X 4 with reports of 5/10 pain at tongue. Pt also with complaints of light sensitivity. Pt lives with her boyfriend in 1 story apartment on ground level. Pt is independent with gait and ADLs, 0 falls, drives, and works.  Pt with some memory loss on details of work specifics and what she was able to do prior to bleed. Pt is slower to respond throughout evaluation but attributes this to tongue pain and not difficulty with word finding. Pt performed STS with CGA HHA and ambulated 30 ft in room with HHA Asiya. Pt ambulates with narrowed EMMANUEL, shuffling gait pattern, some unsteadiness and decreased coordination noted. Gait distance more limited due to light sensitivity as PT kept pt in room to avoid exacerbation of symptoms. Pt returned to sitting and into supine position with SBA. Pt left with all needs met and within reach. Sanjay Treviño will benefit from skilled PT (medically necessary) to address decreased strength, decreased balance, decreased functional tolerance, decreased cardiopulmonary endurance affecting participation in basic ADLs and functional tasks. This section established at most recent assessment PROBLEM LIST (Impairments causing functional limitations): 1. Decreased Strength 2. Decreased ADL/Functional Activities 3. Decreased Transfer Abilities 4. Decreased Ambulation Ability/Technique 5. Decreased Balance 6. Decreased Activity Tolerance 7. Decreased Pacing Skills 8. Increased Fatigue 9. Decreased [place] with Home Exercise Program 
 INTERVENTIONS PLANNED: (Benefits and precautions of physical therapy have been discussed with the patient.) 1. Balance Exercise 2. Bed Mobility 3. Family Education 4. Gait Training 5. Home Exercise Program (HEP) 6. Neuromuscular Re-education/Strengthening 7. Therapeutic Activites 8. Therapeutic Exercise/Strengthening 9. Transfer Training TREATMENT PLAN: Frequency/Duration: 3 times a week for duration of hospital stay Rehabilitation Potential For Stated Goals: Good RECOMMENDED REHABILITATION/EQUIPMENT: (at time of discharge pending progress): Due to the probability of continued deficits (see above) this patient will likely need continued skilled physical therapy after discharge. Equipment:  
? None at this time HISTORY:  
History of Present Injury/Illness (Reason for Referral): 
See H&P below According to boyfriend- pt  Once reached home from work- Saturday evening- found pt in bed sleeping, arousable, c/o left sided headache. Since then pt had been having headache persistent every day. Since Monday 24th family/boyfriend noticed that pt would talk a bit slow, at times memory problems. - but still was able to do day to day activity. Last night boyfriend discussed with pt - pan was to go to hosital/pcp this am.This morning- when boyfriend woke up- he noticed that pt was already awake , sitting at the side of the bed- c/o headache. Decided to come to hospital for further evaluation. Initially to [facility] and then transferred to [facility] for neurosurgical evaluation- intracranial bleed. Past Medical History/Comorbidities: Ms. Felipe Ho  has no past medical history on file. Ms. Felipe Ho  has no past surgical history on file. Social History/Living Environment:  
Home Environment: Apartment # Steps to Enter: 0 One/Two Story Residence: One story Living Alone: No 
Support Systems: Spouse/Significant Other/Partner Patient Expects to be Discharged to[de-identified] Unknown Current DME Used/Available at Home: None Tub or Shower Type: Tub/Shower combination Prior Level of Function/Work/Activity: 
Independent and working Number of Personal Factors/Comorbidities that affect the Plan of Care: 1-2: MODERATE COMPLEXITY EXAMINATION:  
Most Recent Physical Functioning:  
Gross Assessment: 
AROM: Within functional limits Strength: Generally decreased, functional 
Coordination: Generally decreased, functional 
         
  
Posture: 
  
Balance: 
Sitting: Intact; Without support Standing: Intact; With support Bed Mobility: 
Sit to Supine: Stand-by assistance Wheelchair Mobility: 
  
Transfers: 
Sit to Stand: Contact guard assistance Stand to Sit: Contact guard assistance Gait: 
  
Base of Support: Narrowed; Center of gravity altered Speed/Sara: Slow;Shuffled;Delayed Step Length: Left shortened;Right shortened Gait Abnormalities: Decreased step clearance;Shuffling gait Distance (ft): 30 Feet (ft) Assistive Device: (HHA) Ambulation - Level of Assistance: Minimal assistance Interventions: Safety awareness training; Tactile cues; Verbal cues Body Structures Involved: 1. Nerves 2. Heart 3. Lungs 4. Bones 5. Joints 6. Muscles 7. Ligaments Body Functions Affected: 1. Mental 
2. Sensory/Pain 3. Cardio 4. Respiratory 5. Neuromusculoskeletal 
6. Movement Related Activities and Participation Affected: 1. Learning and Applying Knowledge 2. General Tasks and Demands 3. Communication 4. Mobility 5. Self Care 6. Domestic Life 7. Interpersonal Interactions and Relationships 8. Community, Social and [place] Number of elements that affect the Plan of Care: 4+: HIGH COMPLEXITY CLINICAL PRESENTATION:  
Presentation: Evolving clinical presentation with changing clinical characteristics: MODERATE COMPLEXITY CLINICAL DECISION MAKIN [facility] [address] [address] AM-PAC 6 Clicks Basic Mobility Inpatient Short Form How much difficulty does the patient currently have. .. Unable A Lot A Little None 1. Turning over in bed (including adjusting bedclothes, sheets and blankets)? [] 1   [] 2   [x] 3   [] 4  
2. Sitting down on and standing up from a chair with arms ( e.g., wheelchair, bedside commode, etc.)   [] 1   [] 2   [x] 3   [] 4  
3. Moving from lying on back to sitting on the side of the bed? [] 1   [] 2   [x] 3   [] 4 How much help from another person does the patient currently need. .. Total A Lot A Little None 4. Moving to and from a bed to a chair (including a wheelchair)? [] 1   [] 2   [x] 3   [] 4  
5. Need to walk in hospital room? [] 1   [] 2   [x] 3   [] 4  
6. Climbing 3-5 steps with a railing?    [] 1   [] 2   [x] 3   [] 4  
 © 2007, Trustees of [facility] [employer], under license to [employer]. All rights reserved Score:  Initial: 18 Most Recent: X (Date: -- ) Interpretation of Tool:  Represents activities that are increasingly more difficult (i.e. Bed mobility, Transfers, Gait). Score 24 23 22-20 19-15 14-10 9-7 6 Modifier CH CI CJ CK CL CM CN   
 
? Mobility - Walking and Moving Around:  
  - CURRENT STATUS: CK - 40%-59% impaired, limited or restricted  - GOAL STATUS: CI - 1%-19% impaired, limited or restricted  - D/C STATUS:  ---------------To be determined--------------- Payor: SELF PAY / Plan: [facility] SELF PAY / Product Type: Self Pay /   
 
Medical Necessity:    
· Patient is expected to demonstrate progress in strength, balance, coordination and functional technique to decrease assistance required with gait, transfers, and functrional mobility. Artist Fred Reason for Services/Other Comments: 
· Patient continues to require skilled intervention due to decreased strength, decreased balance, decreased functional tolerance, decreased cardiopulmonary endurance affecting participation in basic ADLs and functional tasks. Use of outcome tool(s) and clinical judgement create a POC that gives a: Clear prediction of patient's progress: LOW COMPLEXITY  
  
 
 
 
TREATMENT:  
(In addition to Assessment/Re-Assessment sessions the following treatments were rendered) Pre-treatment Symptoms/Complaints:  Tongue pain 
Pain: Initial:  
Pain Intensity 1: 5  Post Session:  5/10, unchanged with mobility increased with talking Assessment/Reassessment only, no treatment provided today Braces/Orthotics/Lines/Etc:  
· ICU monitors · O2 Device: Room air Treatment/Session Assessment:   
· Response to Treatment:  Amb 30 ft with HHBLAIR Asiya · Interdisciplinary Collaboration:  
o Physical Therapist 
o Occupational Therapist 
o Registered Nurse · After treatment position/precautions:  
o Supine in bed 
o Bed/Chair-wheels locked o Bed in low position 
o Call light within reach 
o RN notified · Compliance with Program/Exercises: Will assess as treatment progresses · Recommendations/Intent for next treatment session: \"Next visit will focus on advancements to more challenging activities and reduction in assistance provided\". Total Treatment Duration: PT Patient Time In/Time Out Time In: 2932 Time Out: 1056 Ward Beltran

## 2018-12-29 NOTE — PROGRESS NOTES
Hospitalist Progress Note   
2018 Admit Date: 2018  1:47 PM  
NAME: Chalo Starks :  1990 MRN:  [id number] Attending: Shaka Collins MD 
PCP:  Unknown, Provider SUBJECTIVE:  
Patient feeling slightly better, photophobia is improved, still c/o HA. Review of Systems negative with exception of pertinent positives noted above PHYSICAL EXAM  
 
Visit Vitals /58 Pulse 91 Temp 98.3 °F (36.8 °C) Resp 19 Ht 5' 4\" (1.626 m) Wt 83.8 kg (184 lb 12.8 oz) SpO2 99% BMI 31.72 kg/m² Temp (24hrs), Av.6 °F (37 °C), Min:98.2 °F (36.8 °C), Max:98.9 °F (37.2 °C) Oxygen Therapy O2 Sat (%): 99 % (18 0700) Pulse via Oximetry: 91 beats per minute (18 0700) O2 Device: Room air (18 1500) Intake/Output Summary (Last 24 hours) at 2018 7413 Last data filed at 2018 1800 Gross per 24 hour Intake 897 ml Output 450 ml Net 447 ml General: NAD Head:  No tenderness/bruising scalp, L face tender, mildly swollen from mandible to zygomatic arch. Small bruise on madible. L eye with conjunctival hemorrhage Lungs:  CTA Bilaterally. Heart:  Regular rate and rhythm,  No murmur, rub, or gallop Abdomen: Soft, Non distended, Non tender, Positive bowel sounds Extremities: No cyanosis, clubbing or edema Neurologic:  No focal deficits, aaox3 ASSESSMENT Active Hospital Problems Diagnosis Date Noted  Intracranial bleed ([address] 2018 Plan: 
[facility] mgmt per neurosurgery recommendations. Repeat CT today, likely out of unit after, pending results/plan for angiogram 
CT facial bones negative for fracture DVT Prophylaxis: SCD Dispo: pending Signed By: Francisco J Paul MD   
 2018

## 2018-12-29 NOTE — PROGRESS NOTES
Bedside shift change report given to [phone] (oncoming nurse) by Alfie Benavides (offgoing nurse). Report included the following information SBAR, Kardex, ED Summary, OR Summary, Procedure Summary, Intake/Output, MAR, Accordion, Recent Results, Med Rec Status, Cardiac Rhythm NSR/sinus javier and Alarm Parameters .

## 2018-12-29 NOTE — INTERDISCIPLINARY ROUNDS
Interdisciplinary team rounds were held 12/29/2018 with the following team members:Nursing, Nurse Practitioner, Physical Therapy, Physician and Clinical Coordinator and the patient. Plan of care discussed. See clinical pathway and/or care plan for interventions and desired outcomes.

## 2018-12-30 LAB
ANION GAP SERPL CALC-SCNC: 7 MMOL/L (ref 7–16)
BUN SERPL-MCNC: 7 MG/DL (ref 6–23)
CALCIUM SERPL-MCNC: 8.2 MG/DL (ref 8.3–10.4)
CHLORIDE SERPL-SCNC: 108 MMOL/L (ref 98–107)
CO2 SERPL-SCNC: 25 MMOL/L (ref 21–32)
CREAT SERPL-MCNC: 0.76 MG/DL (ref 0.6–1)
ERYTHROCYTE [DISTWIDTH] IN BLOOD BY AUTOMATED COUNT: 11.6 % (ref 11.9–14.6)
GLUCOSE SERPL-MCNC: 78 MG/DL (ref 65–100)
HCT VFR BLD AUTO: 37.2 % (ref 35.8–46.3)
HGB BLD-MCNC: 12.4 G/DL (ref 11.7–15.4)
MCH RBC QN AUTO: 31.1 PG (ref 26.1–32.9)
MCHC RBC AUTO-ENTMCNC: 33.3 G/DL (ref 31.4–35)
MCV RBC AUTO: 93.2 FL (ref 79.6–97.8)
NRBC # BLD: 0 K/UL (ref 0–0.2)
PLATELET # BLD AUTO: 161 K/UL (ref 150–450)
PMV BLD AUTO: 10.8 FL (ref 9.4–12.3)
POTASSIUM SERPL-SCNC: 3.6 MMOL/L (ref 3.5–5.1)
RBC # BLD AUTO: 3.99 M/UL (ref 4.05–5.2)
SODIUM SERPL-SCNC: 140 MMOL/L (ref 136–145)
WBC # BLD AUTO: 3.1 K/UL (ref 4.3–11.1)

## 2018-12-30 PROCEDURE — 80048 BASIC METABOLIC PNL TOTAL CA: CPT

## 2018-12-30 PROCEDURE — 74011250636 HC RX REV CODE- 250/636: Performed by: FAMILY MEDICINE

## 2018-12-30 PROCEDURE — 85027 COMPLETE CBC AUTOMATED: CPT

## 2018-12-30 PROCEDURE — 74011250637 HC RX REV CODE- 250/637: Performed by: FAMILY MEDICINE

## 2018-12-30 PROCEDURE — 65270000029 HC RM PRIVATE

## 2018-12-30 PROCEDURE — 36415 COLL VENOUS BLD VENIPUNCTURE: CPT

## 2018-12-30 PROCEDURE — 74011000258 HC RX REV CODE- 258: Performed by: FAMILY MEDICINE

## 2018-12-30 RX ADMIN — HYDROCODONE BITARTRATE AND ACETAMINOPHEN 1 TABLET: 5; 325 TABLET ORAL at 12:56

## 2018-12-30 RX ADMIN — HYDROCODONE BITARTRATE AND ACETAMINOPHEN 1 TABLET: 5; 325 TABLET ORAL at 03:24

## 2018-12-30 RX ADMIN — Medication 10 ML: at 05:46

## 2018-12-30 RX ADMIN — HYDROCODONE BITARTRATE AND ACETAMINOPHEN 1 TABLET: 5; 325 TABLET ORAL at 19:57

## 2018-12-30 RX ADMIN — Medication 10 ML: at 22:30

## 2018-12-30 RX ADMIN — Medication 10 ML: at 14:22

## 2018-12-30 RX ADMIN — SODIUM CHLORIDE 500 MG: 900 INJECTION, SOLUTION INTRAVENOUS at 06:36

## 2018-12-30 RX ADMIN — SODIUM CHLORIDE 500 MG: 900 INJECTION, SOLUTION INTRAVENOUS at 18:07

## 2018-12-30 NOTE — PROGRESS NOTES
Bedside shift change report given to [address] (oncoming nurse) by Saud Pruitt (offgoing nurse). Report included the following information SBAR, Kardex, ED Summary, Intake/Output, MAR, Recent Results, Med Rec Status, Cardiac Rhythm sinus javier and Alarm Parameters .

## 2018-12-30 NOTE — PROGRESS NOTES
I paged Dr. Jose Mitchell about rounding on patient and possibly sending her out to the floor. Was told he would be in the ICU to round on his patients within 2 hours.

## 2018-12-30 NOTE — PROGRESS NOTES
Bedside shift report received from Sandy, [address] [place] [address] and shift assessment completed. Dual neuro assessment completed with MORE Delgado. Pt is A&Ox4. Pt c/o memory loss and tongue pain. SB on monitor on RA, BP stable. Will continue to monitor.

## 2018-12-30 NOTE — INTERDISCIPLINARY ROUNDS
Interdisciplinary team rounds were held 12/30/2018 with the following team members:Nursing, Physical Therapy, Physician and Clinical Coordinator and the patient. Plan of care discussed. See clinical pathway and/or care plan for interventions and desired outcomes.

## 2018-12-30 NOTE — PROGRESS NOTES
Hospitalist Progress Note 2018 Admit Date: 2018  1:47 PM  
NAME: Charlynn Lombard :  1990 MRN:  [id number] Attending: Isidoro Kingsley MD 
PCP:  Unknown, Provider SUBJECTIVE:  
Patient feeling slightly better, photophobia is improved, HA controlled. Getting some more rest, but still drowsy at all times Review of Systems negative with exception of pertinent positives noted above PHYSICAL EXAM  
 
Visit Vitals /72 Pulse 65 Temp 97.9 °F (36.6 °C) Resp 26 Ht 5' 4\" (1.626 m) Wt 83.8 kg (184 lb 12.8 oz) SpO2 100% BMI 31.72 kg/m² Temp (24hrs), Av.7 °F (36.5 °C), Min:97.4 °F (36.3 °C), Max:98 °F (36.7 °C) Oxygen Therapy O2 Sat (%): 100 % (18 1700) Pulse via Oximetry: 62 beats per minute (18 1700) O2 Device: Room air (18 0748) Intake/Output Summary (Last 24 hours) at 2018 1811 Last data filed at 2018 1757 Gross per 24 hour Intake 1722 ml Output  Net 1722 ml General: NAD Head:  No tenderness/bruising scalp, L face tender, mildly swollen from mandible to zygomatic arch. Small bruise on madible. L eye with conjunctival hemorrhage Lungs:  CTA Bilaterally. Heart:  Regular rate and rhythm,  No murmur, rub, or gallop Abdomen: Soft, Non distended, Non tender, Positive bowel sounds Extremities: No cyanosis, clubbing or edema Neurologic:  No focal deficits, aaox3, drowsy ASSESSMENT Active Hospital Problems Diagnosis Date Noted  Intracranial bleed ([facility] [address] 2018 Plan: 
[facility] mgmt per neurosurgery recommendations. Remain in ICU per NSGY 
CT facial bones negative for fracture DVT Prophylaxis: SCD Dispo: pending Signed By: Fareed Kumar MD   
 2018

## 2018-12-31 LAB — GLUCOSE BLD STRIP.AUTO-MCNC: 91 MG/DL (ref 65–100)

## 2018-12-31 PROCEDURE — 74011250637 HC RX REV CODE- 250/637: Performed by: FAMILY MEDICINE

## 2018-12-31 PROCEDURE — 92526 ORAL FUNCTION THERAPY: CPT

## 2018-12-31 PROCEDURE — 65270000029 HC RM PRIVATE

## 2018-12-31 PROCEDURE — 74011250636 HC RX REV CODE- 250/636: Performed by: FAMILY MEDICINE

## 2018-12-31 PROCEDURE — 92523 SPEECH SOUND LANG COMPREHEN: CPT

## 2018-12-31 PROCEDURE — 82962 GLUCOSE BLOOD TEST: CPT

## 2018-12-31 PROCEDURE — 74011000258 HC RX REV CODE- 258: Performed by: FAMILY MEDICINE

## 2018-12-31 PROCEDURE — 99231 SBSQ HOSP IP/OBS SF/LOW 25: CPT | Performed by: PHYSICAL MEDICINE & REHABILITATION

## 2018-12-31 PROCEDURE — 77030020263 HC SOL INJ SOD CL0.9% LFCR 1000ML

## 2018-12-31 RX ADMIN — SODIUM CHLORIDE 500 MG: 900 INJECTION, SOLUTION INTRAVENOUS at 21:30

## 2018-12-31 RX ADMIN — Medication 5 ML: at 14:00

## 2018-12-31 RX ADMIN — SODIUM CHLORIDE 500 MG: 900 INJECTION, SOLUTION INTRAVENOUS at 06:10

## 2018-12-31 RX ADMIN — Medication 10 ML: at 06:10

## 2018-12-31 RX ADMIN — HYDROCODONE BITARTRATE AND ACETAMINOPHEN 1 TABLET: 5; 325 TABLET ORAL at 08:20

## 2018-12-31 RX ADMIN — SODIUM CHLORIDE 75 ML/HR: 900 INJECTION, SOLUTION INTRAVENOUS at 19:30

## 2018-12-31 NOTE — PROGRESS NOTES
[address] [facility] OUTREACH NURSE PROGRESS REPORT SUBJECTIVE: Called to assess patient secondary to transfer from ICU 3104 MEWS Score: 3 (12/30/18 1900) Vitals:  
 12/30/18 1801 12/30/18 1900 12/30/18 2001 12/30/18 2122 BP: 112/67 137/79 118/56 110/74 Pulse: 64 (!) 56 (!) 54 (!) 52 Resp: 15 (!) 33 19 18 Temp:  98.6 °F (37 °C)  97.6 °F (36.4 °C) SpO2: 100% 100% 99% 99% Weight:      
Height:      
  
 
LAB DATA: 
 
Recent Labs 12/30/18 
 12/28/18 
9334  142  
K 3.6 3.6 * 108* CO2 25 27 AGAP 7 7 GLU 78 86 BUN 7 10 CREA 0.76 0.84 GFRAA >60 >60 GFRNA >60 >60  
CA 8.2* 8.6 ALB  --  2.9*  
TP  --  6.7 GLOB  --  3.8* AGRAT  --  0.8* ALT  --  97* Recent Labs 12/30/18 
 12/28/18 
1221 WBC 3.1* 3.2* HGB 12.4 12.7 HCT 37.2 38.1  172 OBJECTIVE: On arrival to room, I found patient to be resting in bed quietly with towel covering eyes. Pain Assessment Pain Intensity 1: 5 (12/30/18 1958) Pain Location 1: Eye 
Pain Intervention(s) 1: Medication (see MAR), Relaxation technique, Repositioned, Rest 
Patient Stated Pain Goal: 0 
 
  
  
  
  
 
  
  
  
   
 
ASSESSMENT:  HR 59. O2 sat 99% on room air. Patient resting in bed with towel covering eyes and lights off, complaining of sensitivity to light, particularly from L eye. L eye with redness present. Pupils 3 mm and brisk. A&Ox4.  strong and equal. No drift noted from any extremities. Denies numbness/tingling. No facial droop noted. Patient complaining of sore mouth from previously biting tongue. Oral gel at bedside which patient has been using for comfort. Denies questions/concerns. VSS. NAD. PLAN:  Will continue to follow per outreach protocol.

## 2018-12-31 NOTE — PROGRESS NOTES
PM&R Consult Progress Note Patient: Juliette Zhang Admit Date: 12/27/2018 Admit Diagnosis: Intracranial bleed ([facility] [address] Recommendations: Continue Acute Rehab Program, Coordination of rehab/medical care, Counseling of PM & R care issues management, Home/Outpatient Rehab 
-neuro intact; hindered by ongoing headache and photophobia but no lateralizing signs. Can likely dc home when cleared medically with [facility] PT/OT . HHA with gait CGA with transfers. Continue therapies during acute stay. Will follow loosely. Excellent rehab prognosis 
-Outpt f/u with Dr Pablo Gregory for cerebral angiogram to r/o any vascular malformations History/Subjective/Complaint:  
 
 Records reviewed. Pain 1 Pain Scale 1: Numeric (0 - 10) (12/31/18 1100) Pain Intensity 1: 0 (12/31/18 1100) Patient Stated Pain Goal: 0 (12/30/18 1958) Pain Reassessment 1: Yes (12/30/18 1342) Pain Onset 1: acute (12/30/18 0319) Pain Location 1: Eye (12/30/18 1958) Pain Orientation 1: Left (12/30/18 1958) Pain Description 1: Aching (12/30/18 1958) Pain Intervention(s) 1: Medication (see MAR); Relaxation technique;Repositioned; Rest (12/30/18 1958) Objective:  
 
Vitals: 
Patient Vitals for the past 8 hrs: 
 BP Temp Pulse Resp SpO2  
12/31/18 0758 99/58 96.9 °F (36.1 °C) (!) 53 20 95 % 12/31/18 0400 104/67 98 °F (36.7 °C) (!) 58 18 96 % Intake and Output: 
12/29 1901 - 12/31 0700 In: 7953 [P.O.:120; I.V.:916] Out: - No Known Allergies Current Facility-Administered Medications Medication Dose Route Frequency  HYDROcodone-acetaminophen (NORCO) 5-325 mg per tablet 1 Tab  1 Tab Oral Q6H PRN  
 influenza vaccine 2018-19 (6 mos+)(PF) (FLUARIX QUAD/FLULAVAL QUAD) injection 0.5 mL  0.5 mL IntraMUSCular PRIOR TO DISCHARGE  benzocaine (ORAGEL) 20 % topical gel (Patient Supplied)   Mucous Membrane PRN  
 sodium chloride (NS) flush 5-10 mL  5-10 mL IntraVENous Q8H  
 sodium chloride (NS) flush 5-10 mL  5-10 mL IntraVENous PRN  
  labetalol (NORMODYNE;TRANDATE) injection 10 mg  10 mg IntraVENous Q10MIN PRN  
 levETIRAcetam (KEPPRA) 500 mg in 0.9% sodium chloride 100 mL IVPB  500 mg IntraVENous Q12H  
 0.9% sodium chloride infusion  75 mL/hr IntraVENous CONTINUOUS  
 niCARdipine in Saline (CARDENE) 25 MG/250 mL infusion kit  5-15 mg/hr IntraVENous TITRATE  acetaminophen (TYLENOL) tablet 650 mg  650 mg Oral Q6H PRN  
 ondansetron (ZOFRAN) injection 4 mg  4 mg IntraVENous Q6H PRN Physical Exam: No significant changes Incision(s)/Wound(s):  
     
 
Functional Assessment: 
Gross Assessment AROM: Within functional limits (12/29/18 1100) Strength: Generally decreased, functional (12/29/18 1100) Coordination: Generally decreased, functional (12/29/18 1100) Sensation: Intact (12/29/18 1038) Gait Base of Support: Narrowed; Center of gravity altered (12/29/18 1100) Speed/Sara: Slow;Shuffled;Delayed (12/29/18 1100) Step Length: Left shortened;Right shortened (12/29/18 1100) Gait Abnormalities: Decreased step clearance;Shuffling gait (12/29/18 1100) Ambulation - Level of Assistance: Minimal assistance (12/29/18 1100) Distance (ft): 30 Feet (ft) (12/29/18 1100) Assistive Device: (HHA) (12/29/18 1100) Interventions: Safety awareness training; Tactile cues; Verbal cues (12/29/18 1100) Bed Mobility Rolling: Stand-by assistance (12/29/18 1038) Supine to Sit: Stand-by assistance (12/29/18 1038) Sit to Supine: Stand-by assistance (12/29/18 1100) Scooting: Stand-by assistance (12/29/18 1038) Balance Sitting: Intact; Without support (12/29/18 1100) Standing: Intact; With support (12/29/18 1100) Bed/Mat Mobility Rolling: Stand-by assistance (12/29/18 1038) Supine to Sit: Stand-by assistance (12/29/18 1038) Sit to Supine: Stand-by assistance (12/29/18 1100) Sit to Stand: Contact guard assistance (12/29/18 1100) Bed to Chair: Contact guard assistance (12/29/18 1038) Scooting: Stand-by assistance (12/29/18 1038) Labs/Studies: 
Recent Results (from the past 72 hour(s)) METABOLIC PANEL, BASIC Collection Time: 12/30/18  3:41 AM  
Result Value Ref Range Sodium 140 136 - 145 mmol/L Potassium 3.6 3.5 - 5.1 mmol/L Chloride 108 (H) 98 - 107 mmol/L  
 CO2 25 21 - 32 mmol/L Anion gap 7 7 - 16 mmol/L Glucose 78 65 - 100 mg/dL BUN 7 6 - 23 MG/DL Creatinine 0.76 0.6 - 1.0 MG/DL  
 GFR est AA >60 >60 ml/min/1.73m2 GFR est non-AA >60 >60 ml/min/1.73m2 Calcium 8.2 (L) 8.3 - 10.4 MG/DL  
CBC W/O DIFF Collection Time: 12/30/18  3:41 AM  
Result Value Ref Range WBC 3.1 (L) 4.3 - 11.1 K/uL  
 RBC 3.99 (L) 4.05 - 5.2 M/uL  
 HGB 12.4 11.7 - 15.4 g/dL HCT 37.2 35.8 - 46.3 % MCV 93.2 79.6 - 97.8 FL  
 MCH 31.1 26.1 - 32.9 PG  
 MCHC 33.3 31.4 - 35.0 g/dL  
 RDW 11.6 (L) 11.9 - 14.6 % PLATELET 964 318 - 690 K/uL MPV 10.8 9.4 - 12.3 FL ABSOLUTE NRBC 0.00 0.0 - 0.2 K/uL GLUCOSE, POC Collection Time: 12/31/18 11:14 AM  
Result Value Ref Range Glucose (POC) 91 65 - 100 mg/dL Assessment:  
 
Active Problems: 
  Intracranial bleed ([address] (12/27/2018) Plan:  
 
Recommendations: Continue Acute Rehab Program 
Coordination of rehab/medical care Counseling of PM & R care issues management Monitoring and management of medical conditions per plan of care/orders Discussion with Family/Caregiver/Staff Reviewed Therapies/Labs/Medications/Records Signed By:  Gisel Subramanian MD   
 December 31, 2018

## 2018-12-31 NOTE — PROGRESS NOTES
NEUROSURGERY PROGRESS NOTE:  
Admit Date: 12/27/2018 Subjective: No acute overnight events. Objective: 
Visit Vitals BP 99/58 (BP 1 Location: Left arm, BP Patient Position: At rest) Pulse (!) 53 Temp 96.9 °F (36.1 °C) Resp 20 Ht 5' 4\" (1.626 m) Wt 184 lb 12.8 oz (83.8 kg) SpO2 95% BMI 31.72 kg/m² General: No acute distress Awake, and answers orientation question, oriented to person, place, time, and situation Eye open spontaneously PERRL, EOMI, left sclera injection improving Face symmetric and tongue mid-line on protrusion Patient with 5/5 strength in the bilateral upper and bilateral lower extremities Assessment and Plan:  
[facility] 25 y.o. female who presented with headaches and photophobia since Saturday and was found to have a intraventricular and intraparenchymal hemorrhage arising in the region of the left lateral ventricle atrium and splenium. At this time the imaging has not demonstrated definitive evidence of underlying vascular malformation or neoplastic process responsible for the hemorrhage. The patient's neurological examination has not changed since presentation. Patient will follow-up with Dr. Barnabas Boast (Endovascular/Vascular Neurosurgeon) in 3-4 weeks time to schedule the patient for a diagnostic cerebral angiogram. 
  
[facility]

## 2018-12-31 NOTE — PROGRESS NOTES
NEUROSURGERY PROGRESS NOTE:  
Admit Date: 12/27/2018 Subjective: No acute overnight events. No new changes per nursing Objective: 
General: No acute distress GCS 15 Awake, and answers orientation question, oriented to person, place, time, and situation Eyes closed secondary to photophobia will intermittently open on command PERRL, EOMI, left sclera injection Face symmetric and tongue mid-line on protrusion Patient with 5/5 strength in the bilateral upper and bilateral lower extremities Assessment and Plan:  
Jermaine Nieto 25 y.o. female who presented with headaches and photophobia since Saturday and was found to have a intraventricular and intraparenchymal hemorrhage arising in the region of the left lateral ventricle atrium and splenium. At this time the imaging has not demonstrated definitive evidence of underlying vascular malformation or neoplastic process responsible for the hemorrhage. The patient's neurological examination has not changed since presentation. Will continue to monitor with q4 neuro checks to monitor for development of hydrocephalus.  
 
- No acute neurosurgical intervention necessary at this time. Transfer to to [address] [place] [address]

## 2018-12-31 NOTE — PROGRESS NOTES
Bedside, Verbal and Written shift change report given to Cyndie Martinez RN by Shen Grady RN. Report included the following information SBAR, Kardex, ED Summary, Intake/Output, MAR, Accordion, Recent Results, Med Rec Status, Cardiac Rhythm SB/SR, Alarm Parameters  and Quality Measures.

## 2018-12-31 NOTE — PROGRESS NOTES
I Spoke with Dr. Franklin Cain about the patient and reviewed the films. I do not see any definitive vascular malformation. However, given her age I do think its reasonable to do an angiogram in a delayed fashion. I will see her in my clinic in 3-4 weeks after the blood has mostly cleared and then schedule her for an angiogram. I will have my office arrange this follow up.

## 2018-12-31 NOTE — PROGRESS NOTES
Date of Outreach Update: 
Bob Whipple has been seen and assessed. Previous Outreach assessment has been reviewed. There have been no significant clinical changes since the completion of the last dated Outreach assessment. Patient sleeping in bed. O2 sat 97% on room air.  HR 86. Per primary RN, no neuro changes noted and no complaints from patient other than persistent photosensitivity.  
  
Will continue to follow up per outreach protocol. 
  
Signed By:     Jessi Martinez Dear RN

## 2018-12-31 NOTE — PROGRESS NOTES
TRANSFER - OUT REPORT: 
 
Verbal report given to James Carmen RN on Murvin Mar  being transferred to  [address] 43 for routine progression of care Report consisted of patients Situation, Background, Assessment and  
Recommendations(SBAR). Information from the following report(s) SBAR, Kardex, ED Summary, Intake/Output, MAR, Accordion, Recent Results, Med Rec Status, Cardiac Rhythm SB/SR, Alarm Parameters  and Quality Measures was reviewed with the receiving nurse. Lines:  
Peripheral IV 12/29/18 Posterior;Right Wrist (Active) Site Assessment Clean, dry, & intact 12/30/2018  7:00 PM  
Phlebitis Assessment 0 12/30/2018  7:00 PM  
Infiltration Assessment 0 12/30/2018  7:00 PM  
Dressing Status Clean, dry, & intact 12/30/2018  7:00 PM  
Dressing Type Tape;Transparent 12/30/2018  7:00 PM  
Hub Color/Line Status Pink;Flushed;Patent; Infusing 12/30/2018  7:00 PM  
Alcohol Cap Used No 12/30/2018  7:00 PM  
  
 
Opportunity for questions and clarification was provided. Patient transported with: 
 Patient-specific medications from Pharmacy Registered Nurse Keppra and lis medications handed off to receiving RN. Patient valuables including cell phone/, and earrings transported with patient.  
 
Dual neuro assessment completed with receiving RN, photophobia still present, NIH 0.

## 2018-12-31 NOTE — PROGRESS NOTES
Hemorrhagic Stroke/Intracerebral Hemorrhage (excludes Subdural/Epidural Hematoma) VTE Prophylaxis: Yes: SCDs Antiplatelet: No 
 
Statin if LDL Greater Than or Equal to100: No 
 
BP Parameters: Less Than 140/90 Controlled With: PRN - IV Dysphagia Screen Completed: Yes: Pass Dysphagia Screening Vocal Quality/Secretions: Normal 
History of Dysphagia: No 
O2 Saturation: Normal 
Alertness: Normal 
Pre-Swallow Assessment Score: 0 Purees: No difficulty noted Water by Cup: No difficulty noted Water by Straw: No difficulty noted Patient has PEG, NG Tube, Feeding Tube: No 
 
Medication orders per above route: Yes 
 
Nutrition Status: PO 
 
NIH Stroke Scale Complete: Yes: 0 Frequency of Vital Signs: Every 4 hours Frequency of Neuro Checks: Every 4 hours Daily Education/Care Plan Updated: Yes Juan R Harvey RN
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
## 2018-12-31 NOTE — PROGRESS NOTES
Progress Note 2018 Admit Date: 2018  1:47 PM  
NAME: Zoie Esquivel :  1990 MRN:  [id number] Attending: Asif Bowden MD 
PCP:  Unknown, Provider Treatment Team: Attending Provider: Asif Bowden MD; Consulting Provider: Jaiden Batista MD; Consulting Provider: Genaro Samuels MD; Consulting Provider: Terrell Sena MD; Care Manager: Tanvir Espana [facility]; Speech Language Pathologist: Joanie Aguayo Full Code SUBJECTIVE:  
Ms. Pasquale Mart is a 26 yo female with no significant PMH who presented with c/o persistent left sided headache. Boyfriend reported pt talking slower than normal and at times memory problems. Pt initially presented to  then transferred DT. CT head showed acute hematoma centered in the body and posterior horn of the left lateral ventricle and extending into the adjacent white matter with surrounding vasogenic edema. CT face without evidence of fx. CTA head/neck performed. MRI brain showed early subacute appearing hemorrhage within the left parietal lobe/splenium of the corpus callosum. MRV performed and was normal.  Neurosurgery consulted. Pt admitted to the ICU, monitored. Repeat CT  showed stable hemorrhage. Dr. Jojo Motley spoke to Neurosurgery, will f/u with 3-4 weeks outpatient. Today pt continues to c/o photophobia, HA however her HA is not worse. No c/o ext weakness, dizziness, slurred speech, numbness/tingling. No past medical history on file. No results found for this or any previous visit (from the past 24 hour(s)). No Known Allergies Current Facility-Administered Medications Medication Dose Route Frequency Provider Last Rate Last Dose  
 HYDROcodone-acetaminophen (NORCO) 5-325 mg per tablet 1 Tab  1 Tab Oral Q6H PRN Esther Pichardo MD   1 Tab at 18 0820  
 influenza vaccine 2018-19 (6 mos+)(PF) (FLUARIX QUAD/FLULAVAL QUAD) injection 0.5 mL  0.5 mL IntraMUSCular PRIOR TO DISCHARGE Asif Bowden MD      
  benzocaine (ORAGEL) 20 % topical gel (Patient Supplied)   Mucous Membrane PRN Anton Lucas MD      
 sodium chloride (NS) flush 5-10 mL  5-10 mL IntraVENous Q8H Joyce Redman MD   10 mL at 18 0610  
 sodium chloride (NS) flush 5-10 mL  5-10 mL IntraVENous PRN Joyce Redman MD      
 labetalol (NORMODYNE;TRANDATE) injection 10 mg  10 mg IntraVENous Q10MIN PRN Joyce Redman MD      
 levETIRAcetam (KEPPRA) 500 mg in 0.9% sodium chloride 100 mL IVPB  500 mg IntraVENous Q12H Joyce Redman MD   500 mg at 18 0610  
 0.9% sodium chloride infusion  75 mL/hr IntraVENous CONTINUOUS Joyce Redman MD 75 mL/hr at 18 2332 75 mL/hr at 18  niCARdipine in Saline (CARDENE) 25 MG/250 mL infusion kit  5-15 mg/hr IntraVENous TITRATE Joyce Redman MD   Stopped at 18  acetaminophen (TYLENOL) tablet 650 mg  650 mg Oral Q6H PRN Charlene Steining, DO   650 mg at 18  ondansetron (ZOFRAN) injection 4 mg  4 mg IntraVENous Q6H PRN Charlene Steining, DO Review of Systems negative with exception of pertinent positives noted above PHYSICAL EXAM  
 
Visit Vitals BP 99/58 (BP 1 Location: Left arm, BP Patient Position: At rest) Pulse (!) 53 Temp 96.9 °F (36.1 °C) Resp 20 Ht 5' 4\" (1.626 m) Wt 83.8 kg (184 lb 12.8 oz) SpO2 95% BMI 31.72 kg/m² Temp (24hrs), Av.8 °F (36.6 °C), Min:96.9 °F (36.1 °C), Max:98.6 °F (37 °C) Oxygen Therapy O2 Sat (%): 95 % (18 0758) Pulse via Oximetry: 55 beats per minute (18) O2 Device: Room air (18 1900) Intake/Output Summary (Last 24 hours) at 2018 1047 Last data filed at 2018 1757 Gross per 24 hour Intake 916 ml Output  Net 916 ml General: No acute distress   
Lungs: CTA bilaterally. Resp even and nonlabored Heart:  S1S2 present without murmurs rubs gallops. RRR. No edema Abdomen: Soft, non tender, non distended. BS present. Extremities: Moves ext spontaneously. No cyanosis. Neurologic:  A/O X4. PERRLA. EOMs intact. Bilateral UE/LE strength 5/5 no drift. Sensation intact. Tongue midline. Facial expressions symmetrical.  Left sclera injected. Results summary of Diagnostic Studies/Procedures copied from within [facility] EMR: 
 
 
ASSESSMENT Active Hospital Problems Diagnosis Date Noted  Intracranial bleed ([address] 12/27/2018 Plan: 
 
Intracranial bleed: 
12/29 CT head stable Neurosurgery following Pt to f/u in 3-4 weeks outpatient with Dr. Dacia Rich for angiogram 
Continue Q4 hour neuro checks Lights off, decrease stimulation Continue Keppra BP control Notes, labs, VS, diagnostic testing reviewed Time spent with pt 20 min DVT Prophylaxis: SCDs Plan of Care Discussed with: Supervising MD Dr. Rae Gonzalez, care team, pt Blayne Major NP

## 2018-12-31 NOTE — PROGRESS NOTES
TRANSFER - IN REPORT: 
 
Verbal report received from liban lebron rn on Jason George  being received from ICU for routine progression of care Report consisted of patients Situation, Background, Assessment and  
Recommendations(SBAR). Information from the following report(s) SBAR, Kardex, Procedure Summary, Intake/Output, MAR and Recent Results was reviewed with the receiving nurse. Opportunity for questions and clarification was provided. Assessment completed upon patients arrival to unit and care assumed.

## 2019-01-01 PROCEDURE — 74011250637 HC RX REV CODE- 250/637: Performed by: FAMILY MEDICINE

## 2019-01-01 PROCEDURE — 65270000029 HC RM PRIVATE

## 2019-01-01 PROCEDURE — 74011250636 HC RX REV CODE- 250/636: Performed by: FAMILY MEDICINE

## 2019-01-01 PROCEDURE — 77030020263 HC SOL INJ SOD CL0.9% LFCR 1000ML

## 2019-01-01 PROCEDURE — 74011000258 HC RX REV CODE- 258: Performed by: FAMILY MEDICINE

## 2019-01-01 RX ADMIN — HYDROCODONE BITARTRATE AND ACETAMINOPHEN 1 TABLET: 5; 325 TABLET ORAL at 08:22

## 2019-01-01 RX ADMIN — LEVETIRACETAM 500 MG: 100 SOLUTION ORAL at 17:41

## 2019-01-01 RX ADMIN — SODIUM CHLORIDE 75 ML/HR: 900 INJECTION, SOLUTION INTRAVENOUS at 12:21

## 2019-01-01 RX ADMIN — SODIUM CHLORIDE 500 MG: 900 INJECTION, SOLUTION INTRAVENOUS at 06:42

## 2019-01-01 RX ADMIN — Medication 10 ML: at 13:54

## 2019-01-01 RX ADMIN — SODIUM CHLORIDE 75 ML/HR: 900 INJECTION, SOLUTION INTRAVENOUS at 21:49

## 2019-01-01 NOTE — PROGRESS NOTES
Bao Sierra mother of a patient, requested Dr Deonna Reyes to call her 01/02/2019 to her cell #[phone].

## 2019-01-01 NOTE — PROGRESS NOTES
Problem: Falls - Risk of 
Goal: *Absence of Falls Document Helene Flanagan Fall Risk and appropriate interventions in the flowsheet. Outcome: Progressing Towards Goal 
Fall Risk Interventions: 
Mobility Interventions: Bed/chair exit alarm, Communicate number of staff needed for ambulation/transfer Medication Interventions: Teach patient to arise slowly, Patient to call before getting OOB, Evaluate medications/consider consulting pharmacy, Bed/chair exit alarm Elimination Interventions: Toileting schedule/hourly rounds, Toilet paper/wipes in reach, Patient to call for help with toileting needs, Elevated toilet seat, Call light in reach, Bed/chair exit alarm History of Falls Interventions: Investigate reason for fall, Evaluate medications/consider consulting pharmacy, Door open when patient unattended, Consult care management for discharge planning, Bed/chair exit alarm Problem: Pressure Injury - Risk of 
Goal: *Prevention of pressure injury Document Henry Scale and appropriate interventions in the flowsheet. Outcome: Progressing Towards Goal 
Pressure Injury Interventions: Activity Interventions: Pressure redistribution bed/mattress(bed type) Nutrition Interventions: Offer support with meals,snacks and hydration Friction and Shear Interventions: HOB 30 degrees or less

## 2019-01-01 NOTE — PROGRESS NOTES
Progress Note 2019 Admit Date: 2018  1:47 PM  
NAME: Yolanda Luis :  1990 MRN:  [id number] Attending: Tl Wise MD 
PCP:  Unknown, Provider Treatment Team: Attending Provider: Tl Wise MD; Consulting Provider: Siva Nieto MD; Consulting Provider: Alta Cartagena MD; Consulting Provider: Agata Carpio MD; Care Manager: Eden Alarcon LMSW; Speech Language Pathologist: Sumit Eugene Nurse: Tung Roebrtson Full Code SUBJECTIVE:  
Ms. Gina Keane is a 24 yo female with no significant PMH who presented with c/o persistent left sided headache. Boyfriend reported pt talking slower than normal and at times memory problems. Pt initially presented to  then transferred DT. CT head showed acute hematoma centered in the body and posterior horn of the left lateral ventricle and extending into the adjacent white matter with surrounding vasogenic edema. CT face without evidence of fx. CTA head/neck performed. MRI brain showed early subacute appearing hemorrhage within the left parietal lobe/splenium of the corpus callosum. MRV performed and was normal.  Neurosurgery consulted. Pt admitted to the ICU, monitored. Repeat CT  showed stable hemorrhage. Dr. Gemma Hernandez spoke to Neurosurgery, will f/u with 3-4 weeks outpatient. Today pt reports she woke up this morning and her HA was gone. Is able to tolerate the TV on today. No c/o ext weakness, dizziness, slurred speech, numbness/tingling. No past medical history on file. Recent Results (from the past 24 hour(s)) GLUCOSE, POC Collection Time: 18 11:14 AM  
Result Value Ref Range Glucose (POC) 91 65 - 100 mg/dL No Known Allergies Current Facility-Administered Medications Medication Dose Route Frequency Provider Last Rate Last Dose  
 HYDROcodone-acetaminophen (NORCO) 5-325 mg per tablet 1 Tab  1 Tab Oral Q6H PRN Pa Duran MD   1 Tab at 19 4175  influenza vaccine - (6 mos+)(PF) (FLUARIX QUAD/FLULAVAL QUAD) injection 0.5 mL  0.5 mL IntraMUSCular PRIOR TO DISCHARGE Emeli Briones MD      
 benzocaine (ORAGEL) 20 % topical gel (Patient Supplied)   Mucous Membrane PRN Xiomara Monique MD      
 sodium chloride (NS) flush 5-10 mL  5-10 mL IntraVENous Q8H Emeli Briones MD   5 mL at 18 1400  
 sodium chloride (NS) flush 5-10 mL  5-10 mL IntraVENous PRN Emeli Briones MD      
 labetalol (NORMODYNE;TRANDATE) injection 10 mg  10 mg IntraVENous Q10MIN PRN Emeli Briones MD      
 levETIRAcetam (KEPPRA) 500 mg in 0.9% sodium chloride 100 mL IVPB  500 mg IntraVENous Q12H Emeli Briones MD   500 mg at 19 4432  
 0.9% sodium chloride infusion  75 mL/hr IntraVENous CONTINUOUS Emeli Briones MD 75 mL/hr at 18 1930 75 mL/hr at 18 1930  
 niCARdipine in Saline (CARDENE) 25 MG/250 mL infusion kit  5-15 mg/hr IntraVENous TITRATE Emeli Briones MD   Stopped at 18  acetaminophen (TYLENOL) tablet 650 mg  650 mg Oral Q6H PRN Victor Manuel Feliz DO   650 mg at 18  ondansetron (ZOFRAN) injection 4 mg  4 mg IntraVENous Q6H PRN Victor Manuel Feliz,  Review of Systems negative with exception of pertinent positives noted above PHYSICAL EXAM  
 
Visit Vitals BP 99/63 (BP 1 Location: Left arm, BP Patient Position: At rest) Pulse (!) 48 Temp 98.2 °F (36.8 °C) Resp 18 Ht 5' 4\" (1.626 m) Wt 83.8 kg (184 lb 12.8 oz) SpO2 97% BMI 31.72 kg/m² Temp (24hrs), Av.2 °F (36.8 °C), Min:98 °F (36.7 °C), Max:98.5 °F (36.9 °C) Oxygen Therapy O2 Sat (%): 97 % (19 0800) Pulse via Oximetry: 55 beats per minute (12/30/18 2001) O2 Device: Room air (18 1900) Intake/Output Summary (Last 24 hours) at 2019 0848 Last data filed at 2018 6792 Gross per 24 hour Intake 720 ml Output  Net 720 ml General:       No acute distress   
 Lungs:          CTA bilaterally. Resp even and nonlabored Heart:            S1S2 present without murmurs rubs gallops. RRR. No edema Abdomen:    Soft, non tender, non distended. BS present. Extremities: Moves ext spontaneously. No cyanosis. Neurologic:  A/O X4. PERRLA. EOMs intact. Bilateral UE/LE strength 5/5 no drift. Sensation intact. Tongue midline. Facial expressions symmetrical.  Left sclera injected. Results summary of Diagnostic Studies/Procedures copied from within [facility] EMR: 
 
 
ASSESSMENT Active Hospital Problems Diagnosis Date Noted  Intracranial bleed ([facility] [address] 12/27/2018 Plan: 
Intracranial bleed: 
12/29 CT head stable Neurosurgery following Pt to f/u in 3-4 weeks outpatient with Dr. Ross Look for angiogram 
Continue Q4 hour neuro checks Decrease stimulation Continue Keppra BP control  
  
  
Notes, labs, VS, diagnostic testing reviewed Time spent with pt 20 min DC once OK with Neurosurgery DVT Prophylaxis: SCDs Plan of Care Discussed with: Supervising MD  Dr. Germain Awad, care team, pt Barney Rawls NP

## 2019-01-02 VITALS
DIASTOLIC BLOOD PRESSURE: 63 MMHG | SYSTOLIC BLOOD PRESSURE: 100 MMHG | TEMPERATURE: 98.6 F | WEIGHT: 184.8 LBS | OXYGEN SATURATION: 91 % | RESPIRATION RATE: 18 BRPM | HEART RATE: 60 BPM | BODY MASS INDEX: 31.55 KG/M2 | HEIGHT: 64 IN

## 2019-01-02 PROCEDURE — 92526 ORAL FUNCTION THERAPY: CPT

## 2019-01-02 PROCEDURE — 97530 THERAPEUTIC ACTIVITIES: CPT

## 2019-01-02 PROCEDURE — 74011250637 HC RX REV CODE- 250/637: Performed by: FAMILY MEDICINE

## 2019-01-02 PROCEDURE — 92507 TX SP LANG VOICE COMM INDIV: CPT

## 2019-01-02 RX ORDER — HYDROCODONE BITARTRATE AND ACETAMINOPHEN 5; 325 MG/1; MG/1
1 TABLET ORAL
Qty: 10 TAB | Refills: 0 | Status: SHIPPED | OUTPATIENT
Start: 2019-01-02 | End: 2022-04-03

## 2019-01-02 RX ORDER — LEVETIRACETAM 500 MG/1
500 TABLET ORAL 2 TIMES DAILY
Qty: 60 TAB | Refills: 0 | Status: SHIPPED | OUTPATIENT
Start: 2019-01-02 | End: 2019-02-01

## 2019-01-02 RX ADMIN — HYDROCODONE BITARTRATE AND ACETAMINOPHEN 1 TABLET: 5; 325 TABLET ORAL at 11:13

## 2019-01-02 RX ADMIN — LEVETIRACETAM 500 MG: 100 SOLUTION ORAL at 06:36

## 2019-01-02 NOTE — DISCHARGE INSTRUCTIONS
DISCHARGE SUMMARY from Nurse    PATIENT INSTRUCTIONS:    After general anesthesia or intravenous sedation, for 24 hours or while taking prescription Narcotics:  · Limit your activities  · Do not drive and operate hazardous machinery  · Do not make important personal or business decisions  · Do  not drink alcoholic beverages  · If you have not urinated within 8 hours after discharge, please contact your surgeon on call. Report the following to your surgeon:  · Excessive pain, swelling, redness or odor of or around the surgical area  · Temperature over 100.5  · Nausea and vomiting lasting longer than 4 hours or if unable to take medications  · Any signs of decreased circulation or nerve impairment to extremity: change in color, persistent  numbness, tingling, coldness or increase pain  · Any questions    What to do at Home:  Recommended activity: Activity as tolerated, per MD instructions    If you experience any of the following symptoms fever > 100.5, nausea, vomiting, pain without relief of medications, chest pain and/or shortness of breath to ER please follow up with MD.    *  Please give a list of your current medications to your Primary Care Provider. *  Please update this list whenever your medications are discontinued, doses are      changed, or new medications (including over-the-counter products) are added. *  Please carry medication information at all times in case of emergency situations. These are general instructions for a healthy lifestyle:    No smoking/ No tobacco products/ Avoid exposure to second hand smoke  Surgeon General's Warning:  Quitting smoking now greatly reduces serious risk to your health.     Obesity, smoking, and sedentary lifestyle greatly increases your risk for illness    A healthy diet, regular physical exercise & weight monitoring are important for maintaining a healthy lifestyle    You may be retaining fluid if you have a history of heart failure or if you experience any of the following symptoms:  Weight gain of 3 pounds or more overnight or 5 pounds in a week, increased swelling in our hands or feet or shortness of breath while lying flat in bed. Please call your doctor as soon as you notice any of these symptoms; do not wait until your next office visit. Recognize signs and symptoms of STROKE:    F-face looks uneven    A-arms unable to move or move unevenly    S-speech slurred or non-existent    T-time-call 911 as soon as signs and symptoms begin-DO NOT go       Back to bed or wait to see if you get better-TIME IS BRAIN. Warning Signs of HEART ATTACK     Call 911 if you have these symptoms:   Chest discomfort. Most heart attacks involve discomfort in the center of the chest that lasts more than a few minutes, or that goes away and comes back. It can feel like uncomfortable pressure, squeezing, fullness, or pain.  Discomfort in other areas of the upper body. Symptoms can include pain or discomfort in one or both arms, the back, neck, jaw, or stomach.  Shortness of breath with or without chest discomfort.  Other signs may include breaking out in a cold sweat, nausea, or lightheadedness. Don't wait more than five minutes to call 911 - MINUTES MATTER! Fast action can save your life. Calling 911 is almost always the fastest way to get lifesaving treatment. Emergency Medical Services staff can begin treatment when they arrive -- up to an hour sooner than if someone gets to the hospital by car. The discharge information has been reviewed with the patient. The patient verbalized understanding. Discharge medications reviewed with the patient and appropriate educational materials and side effects teaching were provided. ___________________________________________________________________________________________________________________________________      Patient Education        Learning About a Hemorrhagic Stroke  What is a hemorrhagic stroke?     When you have a hemorrhagic (say \"hdm-pnc-VZ-leyla\") stroke, it means that a blood vessel in the brain has burst open or has started to leak. When the blood spills into the space inside and around the brain, it damages nearby nerve cells. This is different from an ischemic (say \"dlj-CYR-amyy\") stroke, which happens when a blood clot blocks a blood vessel in the brain. The brain damage from a stroke starts within minutes. Quick treatment can help limit damage to the brain and make recovery more likely. People who have had a stroke may have a hard time talking, understanding things, and making decisions. They may have to relearn daily activities, such as how to eat, bathe, and dress. How well someone recovers from a stroke depends on how quickly the person gets to the hospital, where in the brain the stroke happened, and how severe it was. Stroke rehabilitation, which includes training and therapy, also helps people recover. What are the symptoms? If you have any of these symptoms, call 911 or other emergency services right away. · You have symptoms of a stroke. These may include:  ? Sudden numbness, tingling, weakness, or loss of movement in your face, arm, or leg, especially on only one side of your body. ? Sudden vision changes. ? Sudden trouble speaking. ? Sudden confusion or trouble understanding simple statements. ? Sudden problems with walking or balance. ? A sudden, severe headache that is different from past headaches. See your doctor if you have symptoms that seem like a stroke, even if they go away quickly. You may have had a transient ischemic attack (TIA), sometimes called a mini-stroke. A TIA is a warning that a stroke may happen soon. Getting early treatment for a TIA can help prevent a stroke. What causes a hemorrhagic stroke? A hemorrhagic stroke happens to blood vessels that have been weakened. The most common causes of weakened blood vessels in the brain are:  · A brain aneurysm.  This is a bulging, weak part of a blood vessel. It can put pressure on nerves, or it can bleed or break open (rupture). · A brain AVM. This is an abnormal knot of weak blood vessels that some people are born with. · Head injury. · Chronic uncontrolled high blood pressure. Blood pressure that is too high over the long term increases your risk for stroke. · Very high blood pressure. Very high blood pressure that comes on suddenly is dangerous. It is a medical emergency. Anticoagulant and antiplatelet medicines can also cause bleeding in the brain. These medicines, also called blood thinners, increase the time it takes for a blood clot to form. How is hemorrhagic stroke treated? Emergency treatment is done to stop the bleeding and prevent damage to the brain. · You may need surgery to repair an aneurysm or to remove the blood that has built up inside the brain. · You may be given medicine to stop the bleeding. · You will be closely watched for signs of increased pressure on the brain. These signs include restlessness, confusion, trouble following commands, and headache. · You may take medicine to manage high blood pressure. Ask your doctor if a stroke rehab program is right for you. Rehab increases your chances of getting back some of the abilities you lost.  How can you prevent another stroke? · Work with your doctor to treat health problems, such as high blood pressure, that raise your chances of having another stroke. · Be safe with medicines. Take your medicine exactly as prescribed. Call your doctor if you think you are having a problem with your medicine. · Have a healthy lifestyle. ? Do not smoke or allow others to smoke around you. If you need help quitting, talk to your doctor about stop-smoking programs and medicines. These can increase your chances of quitting for good. Smoking makes a stroke more likely. ? Limit alcohol to 2 drinks a day for men and 1 drink a day for women. ? Be active.   Ask your doctor what type and level of activity is safe for you.  ? Eat heart-healthy foods, like fruits, vegetables, and high-fiber foods. ? Stay at a healthy weight. Lose weight if you need to. Follow-up care is a key part of your treatment and safety. Be sure to make and go to all appointments, and call your doctor if you are having problems. It's also a good idea to know your test results and keep a list of the medicines you take. Where can you learn more? Go to http://twila-luis miguel.info/. Enter R416 in the search box to learn more about \"Learning About a Hemorrhagic Stroke. \"  Current as of: November 21, 2017  Content Version: 11.8  © 4149-0506 [employer]. Care instructions adapted under license by [employer] (which disclaims liability or warranty for this information). If you have questions about a medical condition or this instruction, always ask your healthcare professional. [address] any warranty or liability for your use of this information.

## 2019-01-02 NOTE — PROGRESS NOTES
CM met with patient in room on this date to assist with post-discharge need for PCP. Provided patient with information for [employer] and educated her about process to establish herself as a new patient after discharge. Patient will DC to home today.

## 2019-01-02 NOTE — PROGRESS NOTES
Hemorrhagic Stroke/Intracerebral Hemorrhage (excludes Subdural/Epidural Hematoma) VTE Prophylaxis: Yes: SCDs Antiplatelet: No 
 
Statin if LDL Greater Than or Equal to100: No 
 
BP Parameters: Less Than 140/90 Controlled With: PRN - IV Dysphagia Screen Completed: Yes: Pass Dysphagia Screening Vocal Quality/Secretions: Normal 
History of Dysphagia: No 
O2 Saturation: Normal 
Alertness: Normal 
Pre-Swallow Assessment Score: 0 Purees: No difficulty noted Water by Cup: No difficulty noted Water by Straw: No difficulty noted Patient has PEG, NG Tube, Feeding Tube: No 
 
Medication orders per above route: Yes 
 
Nutrition Status: PO 
 
NIH Stroke Scale Complete: Yes: 1 Frequency of Vital Signs: Every 4 hours Frequency of Neuro Checks: Every 4 hours Daily Education/Care Plan Updated: Yes Bedside report to Sarah RN Leim Ormond, RN

## 2019-01-02 NOTE — DISCHARGE SUMMARY
Discharge Summary   Patient ID:  Ronald Patricio  [id number]  33 y.o.  1990  Admit date: 12/27/2018  1:47 PM  Discharge date and time: 1/2/2019  Attending: Ev Cabrera MD  PCP:  Unknown, Provider  Treatment Team: Attending Provider: Ev Cabrera MD; Consulting Provider: Laisha Arriaga MD; Consulting Provider: Nas Fischer MD; Care Manager: Neli Trinh LM; Speech Language Pathologist: Kadeem Byers  Principal Diagnosis Intracranial bleed [facility])   Principal Problem:    Intracranial bleed ([facility] [address] (12/27/2018)       * Admission Diagnoses: Intracranial bleed [facility])  * Discharge Diagnoses:    Hospital Problems as of 1/2/2019 Never Reviewed          Codes Class Noted - Resolved POA    * (Principal) Intracranial bleed ([facility] [address] ICD-10-CM: I62.9  ICD-9-CM: 432.9  12/27/2018 - Present Unknown                Hospital Course:  Ms. Tonny Rob is a 26 yo female with no significant PMH who presented with c/o persistent left sided headache.  Boyfriend reported pt talking slower than normal and at times memory problems.  Pt initially presented to  then transferred DT.  CT head showed acute hematoma centered in the body and posterior horn of the left lateral ventricle and extending into the adjacent white matter with surrounding vasogenic edema.  CT face without evidence of fx.  CTA head/neck performed.  MRI brain showed early subacute appearing hemorrhage within the left parietal lobe/splenium of the corpus callosum. MRV performed and was normal.  Neurosurgery consulted. Pt admitted to the ICU, monitored. Gerard July CT 12/29 showed stable hemorrhage.  Dr. Michelle Enamorado spoke to Neurosurgery, will f/u with 3-4 weeks outpatient.  Today pt reports no HA or photophobia. No c/o ext weakness, dizziness, slurred speech, numbness/tingling.  I spoke to Dr. Ascencion Cuevas myself, pt ok to IN and f/u with Dr. Michelle Enamorado outpatient.          Diagnostic Study/Procedure results summary copied from [place] [id number] [address] [facility] EMR:  EXAMINATION: HEAD CT WITHOUT CONTRAST 12/27/2018 12:53 PM     ACCESSION NUMBER: [id number]     INDICATION: Headache, acute, severe, thunderclap, worst HA of life     COMPARISON: None available     TECHNIQUE: Multiple-row detector helical CT examination of the head without  intravenous contrast.      Radiation dose reduction techniques were used for this study:  Our CT scanners  use one or all of the following: Automated exposure control, adjustment of the  mA and/or kVp according to patient's size, iterative reconstruction.     FINDINGS:     2.7 x 2.2 cm hemorrhage centered within the posterior body and posterior horn of  the left lateral ventricle. Hemorrhagic products extend into the white matter  abutting the ventricle, with surrounding vasogenic edema.      There is no associated midline shift. The basilar cisterns remain patent.       There is no evidence of acute ischemic infarction.      The paranasal sinuses and mastoid air cells are well aerated and clear.     IMPRESSION  IMPRESSION:     2.7 x 2.2 cm acute hematoma centered in the body and posterior horn of the left  lateral ventricle and extending into the adjacent white matter with surrounding  vasogenic edema. In a patient of this age, there are variety of possible  etiologies. In particular, an underlying mass must be evaluated for, including  MRI follow-up until complete resolution of hemorrhage. Although this would be an  atypical location for hemorrhage associated with dural venous sinus thrombosis,  correlation for the history of a hypercoagulable state (including oral  contraceptive use) is recommended.      When able, further evaluation with a brain MRI with and without intravenous  contrast and consideration towards an MR venogram are recommended.     Discussed with Suzanne Kingsley by Dr. Beth Lanes at 12:58 PM 12/27/2018.     VOICE DICTATED BY: Dr. Annie Fernandez       History: Persistent headache.  Intraparenchymal hemorrhage.     Comments: CT ANGIOGRAM OF THE NECK AND CT ANGIOGRAM OF THE [place] was  obtained following the administration of IV contrast. IV contrast was  administered to evaluate the arterial vasculature. Reformatted images in the  coronal and sagittal planes as well as 3-D imaging was obtained and reviewed on  a dedicated PACS and [address]. Radiation reduction dose techniques  were used for the study. Our CT scanner use one or all of the following-  Automated exposure control, adjustment of the mA and/or KV according the patient  size, iterative reconstruction.     Findings:     CT ANGIOGRAM OF THE NECK:     There is classic arch anatomy. No evidence of significant great vessel stenosis. Evaluation of the right carotid artery system demonstrates no significant  stenosis. Evaluation left carotid artery system demonstrates no significant  stenosis. All measurements are based upon NASCET criteria. The vertebral  arteries are patent and are codominant. Evaluation of the lung apices  demonstrate them to be clear. Thyroid gland is unremarkable.     [place]:     CT angiogram of [place] demonstrates the petrous, cavernous, and  supraclinoid internal carotid arteries to be patent. The anterior and middle  cerebral arteries are patent. The distal vertebral arteries, basilar artery and  posterior cerebral arteries are patent.      There is increased enhancement within the right temporal lobe with what appears  to be both arterial and venous components and cannot totally exclude a vascular  malformation in this region however this would not be directly related to the  hemorrhage identified in and adjacent to the left lateral ventricle. However,  this may be related to venous phase imaging which would make this region more  prominent.  Would recommend repeat CT angiogram of the head versus cerebral  arteriography to further evaluate     Hemorrhage centered in and adjacent to the posterior horn of left lateral  ventricle is again identified on this exam.     IMPRESSION  Impression:  1. Questionable vascular malformation within the right temporal lobe as  described above. Would recommend a repeat CTA exam or cerebral arteriography to  verify this finding as it may be related to venous phase imaging. 2. Hemorrhage centered in and around the posterior horn of the left lateral  ventricle with associated vasogenic edema      EXAMINATION: FACE CT WITHOUT CONTRAST 12/28/2018 3:44 PM     ACCESSION NUMBER: [id number]     INDICATION: trauma. Left eyes bloodshot and red, effaces or to the touch,  possible trauma     COMPARISON: None available     TECHNIQUE: Multiple-row detector helical CT examination of the head without  intravenous contrast. Multiplanar reformations were generated. Radiation dose  reduction techniques were used for this study:  Our CT scanners use one or all  of the following: Automated exposure control, adjustment of the mA and/or kVp  according to patient's size, iterative reconstruction.     FINDINGS:     Unremarkable noncontrasted appearance of the orbital soft tissues.      Within the limits of imaging technique, the included intracranial soft tissues  are unremarkable.      There is minimal bilateral maxillary sinus mucosal thickening. The paranasal  sinuses included portions of the mastoid air cells are otherwise well aerated  and clear. The retroantral fat is clear bilaterally.      There is mild symmetric prominence of the lymphoid tissue of Waldeyer's ring.      No evidence of acute facial fracture.     IMPRESSION  IMPRESSION:     No evidence of acute facial fracture.     Unremarkable noncontrasted appearance of the globes and orbital soft tissues  bilaterally.     Minimal maxillary sinus mucosal thickening. Otherwise clear paranasal sinuses  and included portions of the mastoid air cells.     There are cavities and periapical lucencies involving multiple maxillary teeth.   Correlation with dental exam recommended.     VOICE DICTATED BY: Dr. Adrianna Peck, 12/29/2018      History: Follow-up intracranial hemorrhage.     Comparison: CT head without contrast 12/27/2018      Technique:   5 mm axial scans from the skull base to the vertex. All CT scans  performed at this facility use one or all of the following: Automated exposure  control, adjustment of the mA and/or kVp according to patient's size, iterative  reconstruction.      Findings: Stable acute appearing intraparenchymal hemorrhage is seen centered in  the left peritrigonal white matter with hematoma at this level measuring up to  2.8 cm x 2.3 cm in greatest transverse dimensions. No new acute hemorrhage is  otherwise seen. The ventricles are stable in size and configuration without  evidence for evolving hydrocephalus. No evidence for evolving herniation is  otherwise seen. No evolving edema is seen in a vascular distribution to suggest  evolving large artery infarction. Only stable likely secondary edema is seen  adjacent to the previously described intraparenchymal hemorrhage.     Evaluation with bone windows shows no acute osseous changes. The visualized  sinuses, middle ears, and mastoid air cells are well aerated.     IMPRESSION  IMPRESSION:    1.  Stable intraparenchymal hemorrhage. No new hemorrhage or evidence for  evolving hydrocephalus, or herniation is seen.          MRI brain with and without contrast     History: Intracranial hemorrhage noted on today's CT scan. Headache beginning 5  days ago.     Imaging sequences: Sagittal short TR/short TE, axial short TR/short TE, long  TR/long TE, FLAIR, gradient recall, diffusion weighted images and ADC mapping. Axial and coronal short TR/short TE postcontrast images. Imaging was performed  on a 1.5 Berna magnet, utilizing the uneventful administration of 17 mL of  intravenous [place] and order to better evaluate for intracranial pathology.     Comparison: None. Correlation is made to the CT scan of the brain performed  earlier on the same day.     Findings: Again noted is the intraparenchymal hemorrhage within the left  parietal lobe, along the posterior splenium of the corpus callosum. This is  closely associated with the atria of the left lateral ventricle. This is  primarily hyperintense on T1 and hypointense on T2-weighted images suggesting  primarily early subacute blood products. This measures approximately 2.2 x 2.8 x  2.8 cm in AP, transverse and craniocaudal dimensions, respectively. No  convincing associated enhancement is present. There is mild surrounding edema. There is no midline shift or hydrocephalus.     There are no extra-axial fluid collections. Normal flow voids are present  within all of the major intracranial vessels. There are no areas of restricted  diffusion to suggest an acute or subacute infarction. No abnormal areas of  enhancement are identified following contrast administration. The visualized  mastoid air cells and paranasal sinuses are well pneumatized and aerated.     IMPRESSION  Impression: Early subacute appearing hemorrhage within the left parietal  lobe/splenium of the corpus callosum. No convincing associated enhancement is  present. The differential diagnosis includes trauma, coagulopathy, hemorrhagic  infarct versus hemorrhage from an underlying mass or vascular lesion which may  be obscured. Consider follow-up imaging in 2-3 months after resolution for  Reassessment.         Head MRV     History: Intraparenchymal hemorrhage     Sequences: Contiguous coronal 2D time-of-flight images of the head were used to  generate maximum intensity projection images of the dural venous sinuses and  deep cerebral veins.     Comparison: CT angiogram and MRI of the brain     Findings:     The signal within the jugular veins, sigmoid and transverse sinuses as well as  the superior sagittal sinus, straight sinus and deep to veins is within normal  limits.        Summary:     1. Normal MRV          Labs: Results:       Chemistry No results for input(s): GLU, NA, K, CL, CO2, BUN, CREA, CA, AGAP, BUCR, TBIL, TBILI, GPT, ALT, AP, TP, ALB, GLOB, AGRAT in the last 72 hours. CBC w/Diff No results for input(s): WBC, RBC, HGB, HCT, PLT, GRANS, LYMPH, EOS, HGBEXT, HCTEXT, PLTEXT in the last 72 hours. Cardiac Enzymes No results for input(s): CPK, CKND1, CALEB in the last 72 hours. No lab exists for component: CKRMB, TROIP   Coagulation No results for input(s): PTP, INR, APTT in the last 72 hours. No lab exists for component: INREXT    Lipid Panel @BRIEFLAB(CHOL,CHOLPOCT,[id number],[id number],CHOLX,CHOLP,CHLST,CHOLV,223334,HDL,HDLPOC,HDLPOCT,589962,NHDLCT,[id number],HDLC,HDLP,LDL,LDLPOCT,LDLCPOC,412992,NLDLCT,DLDL,LDLC,DLDLP,963987,VLDLC,VLDL,TGL,TGLX,TRIGL,[id number],TRIGP,TGLPOCT,259834,499761,CHHD,CHHDX)@   BNP No results for input(s): BNPP in the last 72 hours. Liver Enzymes No results for input(s): TP, ALB, TBIL, AP, SGOT, GPT in the last 72 hours. No lab exists for component: DBIL   Thyroid Studies No results found for: T4, T3U, TSH, TSHEXT         Discharge Exam:  Visit Vitals  /63   Pulse 60   Temp 98.6 °F (37 °C)   Resp 18   Ht 5' 4\" (1.626 m)   Wt 83.8 kg (184 lb 12.8 oz)   SpO2 91%   BMI 31.72 kg/m²         General:       No acute distress    Lungs:          CTA bilaterally. Resp even and nonlabored  Heart:            S1S2 present without murmurs rubs gallops. RRR. No edema  Abdomen:    Soft, non tender, non distended. BS present.   Extremities: Moves ext spontaneously.  No cyanosis. Neurologic:  A/O X4.  PERRLA.  EOMs intact.  Bilateral UE/LE strength 5/5 no drift. Sensation intact. Tongue midline.  Facial expressions symmetrical.  Left sclera injected.         Disposition: home  Discharge Condition: stable  Patient Instructions:   Current Discharge Medication List      START taking these medications    Details HYDROcodone-acetaminophen (NORCO) 5-325 mg per tablet Take 1 Tab by mouth every six (6) hours as needed. Max Daily Amount: 4 Tabs. Qty: 10 Tab, Refills: 0    Associated Diagnoses: Intracranial bleed (HCC)      levETIRAcetam (KEPPRA) 500 mg tablet Take 1 Tab by mouth two (2) times a day for 30 days. Qty: 60 Tab, Refills: 0             Activity: Activity as tolerated, No driving while on analgesics and No driving until cleared by physician  Diet: Cardiac Diet  Wound Care: None needed      Full Code   Surrogate decision maker: Mother    Pneumonia and flu vaccine to be administered at discharge per hospital protocol     Follow-up  ·   Establish PCP f/u 1 week  · FU Dr. Prince Putnam 3-4 weeks  · Continue Keppra 500 mg BID  · Discussed in length no driving until cleared by physician, pt verbalized understanding.         Notes, labs, VS, diagnostic testing reviewed  Case discussed with pt, care team, Dr. Yepez , mother via phone with pt permission       Time spent to discharge patient  45 min   Signed:  Gerard Davalos NP  1/2/2019  1:10 PM

## 2019-01-02 NOTE — PROGRESS NOTES
Problem: Mobility Impaired (Adult and Pediatric) Goal: *Acute Goals and Plan of Care (Insert Text) STG: 
(1.)Ms. Wilson will move from supine to sit and sit to supine  with MODIFIED INDEPENDENCE within 3 treatment day(s). (2.)Ms. Tony Holm will transfer from bed to chair and chair to bed with SUPERVISION using the least restrictive device within 3 treatment day(s). (3.)Ms. Wilson will ambulate with CONTACT GUARD ASSIST for 250 feet with the least restrictive device within 3 treatment day(s). LTG: 
(1.)Ms. Tony Holm will move from supine to sit and sit to supine  in bed with INDEPENDENT within 7 treatment day(s). (2.)Ms. Tony Holm will transfer from bed to chair and chair to bed with INDEPENDENT using the least restrictive device within 7 treatment day(s). (3.)Ms. Wilson will ambulate with INDEPENDENT for 500+ feet with the least restrictive device within 7 treatment day(s). ________________________________________________________________________________________________ PHYSICAL THERAPY: Daily Note, Treatment Day: 1st, AM 1/2/2019INPATIENT: Hospital Day: 7 Payor: SELF PAY / Plan: [facility] SELF PAY / Product Type: Self Pay /  
  
NAME/AGE/GENDER: Doni Asehr is a 25 y.o. female PRIMARY DIAGNOSIS: Intracranial bleed (HCC) Intracranial bleed (HCC) Intracranial bleed ([address] ICD-10: Treatment Diagnosis:  
 · Generalized Muscle Weakness (M62.81) · Other lack of cordination (R27.8) · Difficulty in walking, Not elsewhere classified (R26.2) Precaution/Allergies: 
Patient has no known allergies. ASSESSMENT:  
Ms. Tony Holm is sitting in chair upon contact and agreeable to PT treatment. She is eager to get up and ambulate and get out of the room. She stood and ambulated 750' slowly but safely without an AD with modified I with only very min use of handrail twice and path deviations. She returned to room and was left with all needs met and within reach.  Doni Asher will benefit from skilled PT (medically necessary) to address decreased strength, decreased balance, decreased functional tolerance, decreased cardiopulmonary endurance affecting participation in basic ADLs and functional tasks. This section established at most recent assessment PROBLEM LIST (Impairments causing functional limitations): 1. Decreased Strength 2. Decreased ADL/Functional Activities 3. Decreased Transfer Abilities 4. Decreased Ambulation Ability/Technique 5. Decreased Balance 6. Decreased Activity Tolerance 7. Decreased Pacing Skills 8. Increased Fatigue 9. Decreased Piatt with Home Exercise Program 
 INTERVENTIONS PLANNED: (Benefits and precautions of physical therapy have been discussed with the patient.) 1. Balance Exercise 2. Bed Mobility 3. Family Education 4. Gait Training 5. Home Exercise Program (HEP) 6. Neuromuscular Re-education/Strengthening 7. Therapeutic Activites 8. Therapeutic Exercise/Strengthening 9. Transfer Training TREATMENT PLAN: Frequency/Duration: 3 times a week for duration of hospital stay Rehabilitation Potential For Stated Goals: Good RECOMMENDED REHABILITATION/EQUIPMENT: (at time of discharge pending progress): Due to the probability of continued deficits (see above) this patient will likely need continued skilled physical therapy after discharge. Equipment:  
? None at this time HISTORY:  
History of Present Injury/Illness (Reason for Referral): 
See H&P below According to boyfriend- pt  Once reached home from work- Saturday evening- found pt in bed sleeping, arousable, c/o left sided headache. Since then pt had been having headache persistent every day. Since Monday 24th family/boyfriend noticed that pt would talk a bit slow, at times memory problems. - but still was able to do day to day activity.  Last night boyfriend discussed with pt - pan was to go to hosital/pcp this am.This morning- when boyfriend woke up- he noticed that pt was already awake , sitting at the side of the bed- c/o headache. Decided to come to hospital for further evaluation. Initially to [facility] and then transferred to [facility] for neurosurgical evaluation- intracranial bleed. Past Medical History/Comorbidities: Ms. Kimberly Valverde  has no past medical history on file. Ms. Kimberly Valverde  has no past surgical history on file. Social History/Living Environment:  
Home Environment: Apartment # Steps to Enter: 0 One/Two Story Residence: One story Living Alone: No 
Support Systems: Spouse/Significant Other/Partner Patient Expects to be Discharged to[de-identified] Unknown Current DME Used/Available at Home: None Tub or Shower Type: Tub/Shower combination Prior Level of Function/Work/Activity: 
Independent and working Number of Personal Factors/Comorbidities that affect the Plan of Care: 1-2: MODERATE COMPLEXITY EXAMINATION:  
Most Recent Physical Functioning:  
Gross Assessment: 
  
         
  
Posture: 
  
Balance: 
Sitting: Intact Standing: Impaired Standing - Static: Good Standing - Dynamic : Good(-) Bed Mobility: 
  
Wheelchair Mobility: 
  
Transfers: 
  
Gait: 
  
Base of Support: Center of gravity altered;Narrowed Speed/Sara: Shuffled; Slow Step Length: Left shortened;Right shortened Gait Abnormalities: Path deviations Distance (ft): 750 Feet (ft) Assistive Device: (none) Ambulation - Level of Assistance: Modified independent Body Structures Involved: 1. Nerves 2. Heart 3. Lungs 4. Bones 5. Joints 6. Muscles 7. Ligaments Body Functions Affected: 1. Mental 
2. Sensory/Pain 3. Cardio 4. Respiratory 5. Neuromusculoskeletal 
6. Movement Related Activities and Participation Affected: 1. Learning and Applying Knowledge 2. General Tasks and Demands 3. Communication 4. Mobility 5. Self Care 6. Domestic Life 7. Interpersonal Interactions and Relationships 8. Community, Social and [place] Number of elements that affect the Plan of Care: 4+: HIGH COMPLEXITY CLINICAL PRESENTATION:  
Presentation: Evolving clinical presentation with changing clinical characteristics: MODERATE COMPLEXITY CLINICAL DECISION MAKIN [facility] [address] [address] AM-PAC 6 Clicks Basic Mobility Inpatient Short Form How much difficulty does the patient currently have. .. Unable A Lot A Little None 1. Turning over in bed (including adjusting bedclothes, sheets and blankets)? [] 1   [] 2   [x] 3   [] 4  
2. Sitting down on and standing up from a chair with arms ( e.g., wheelchair, bedside commode, etc.)   [] 1   [] 2   [x] 3   [] 4  
3. Moving from lying on back to sitting on the side of the bed? [] 1   [] 2   [x] 3   [] 4 How much help from another person does the patient currently need. .. Total A Lot A Little None 4. Moving to and from a bed to a chair (including a wheelchair)? [] 1   [] 2   [x] 3   [] 4  
5. Need to walk in hospital room? [] 1   [] 2   [x] 3   [] 4  
6. Climbing 3-5 steps with a railing? [] 1   [] 2   [x] 3   [] 4  
© , Trustees of [address] [facility] [address] [address], under license to [employer]. All rights reserved Score:  Initial: 18 Most Recent: X (Date: -- ) Interpretation of Tool:  Represents activities that are increasingly more difficult (i.e. Bed mobility, Transfers, Gait). Score 24 23 22-20 19-15 14-10 9-7 6 Modifier CH CI CJ CK CL CM CN   
 
? Mobility - Walking and Moving Around:  
  - CURRENT STATUS: CK - 40%-59% impaired, limited or restricted  - GOAL STATUS: CI - 1%-19% impaired, limited or restricted  - D/C STATUS:  ---------------To be determined--------------- Payor: SELF PAY / Plan: [facility] SELF PAY / Product Type: Self Pay /   
 
Medical Necessity:    
· Patient is expected to demonstrate progress in strength, balance, coordination and functional technique to decrease assistance required with gait, transfers, and functrional mobility. Karl Riley Reason for Services/Other Comments: 
· Patient continues to require skilled intervention due to decreased strength, decreased balance, decreased functional tolerance, decreased cardiopulmonary endurance affecting participation in basic ADLs and functional tasks. Use of outcome tool(s) and clinical judgement create a POC that gives a: Clear prediction of patient's progress: LOW COMPLEXITY  
  
 
 
 
TREATMENT:  
(In addition to Assessment/Re-Assessment sessions the following treatments were rendered) Pre-treatment Symptoms/Complaints:  Mild HA. RN notified. Pain: Initial: 0 except HA Post Session:  0 Therapeutic Activity: (    11 min): Therapeutic activities including chair transfers and ambulation safety on level surface to improve mobility, strength and balance. Required very min use of hand rail   to promote dynamic balance in standing. Braces/Orthotics/Lines/Etc:  
· O2 Device: Room air Treatment/Session Assessment:   
· Response to Treatment: See above · Interdisciplinary Collaboration:  
o Physical Therapy Assistant 
o Registered Nurse · After treatment position/precautions:  
o Up in chair 
o Bed/Chair-wheels locked 
o Bed in low position 
o Call light within reach 
o RN notified · Compliance with Program/Exercises: Will assess as treatment progresses · Recommendations/Intent for next treatment session: \"Next visit will focus on advancements to more challenging activities and reduction in assistance provided\". Total Treatment Duration: PT Patient Time In/Time Out Time In: 3040 Time Out: [address], [place]

## 2019-01-02 NOTE — PROGRESS NOTES
Dysphagia Goals: LTG: Patient will tolerate least restrictive diet without overt signs or symptoms of airway compromise. STG: Patient will tolerate full liquid diet without overt signs or symptoms of airway compromise. (discontinue 12/31) STG: Patient will participate trials of chewable textures to assess for diet advancement  (met 12/31) STG: Patient will tolerate regular diett/thin liquids without overt signs or symptoms of airway compromise (edited 1/2/19) Speech/Language/Cognitive Goals: LTG: Patient will increase neuro-linguistic abilities to increase safety and awareness of deficits. STG: Patient will recite memory strategies with 80% accuracy with supervision STG: Patient will complete immediate and delayed recall tasks with 80%  Accuracy given minimal cues STG: Patient will complete divergent/convergent naming tasks with 80% accuracy given minimal cues STG: Patient will participate in functional problem solving tasks with 80% accuracy given minimal cues STG: Patient will participate with further assessment of reading, writing skills for financial and/ or medication management. Speech language pathology: bedside swallow and speech language note: Daily Note 3 NAME/AGE/GENDER: Tressa Lew is a 25 y.o. female DATE: 1/2/2019 PRIMARY DIAGNOSIS: Intracranial bleed ([facility] [address] ICD-10: Treatment Diagnosis: R13.11 Oral Dysphagia, R41.841 Cognitive communication deficit INTERDISCIPLINARY COLLABORATION: Registered NurseASSESSMENT:Seen today for po trials of regular textures and cognitive treatment. Swallowing: Trials of chewable consistencies tolerated without lingual pain today. No overt s/sx of penetration/aspiration with thin liquid or solid trials. Recommend upgrade regular diet/thin liquids. No further dysphagia treatment indicated as patient with normal swallow function and able to independently choose appropriate textures. Speech Language-Cognitive: Participated in cognitive-linguistic treatment with minimal cueing. Taught compensatory strategies to improve memory. Recall (5 items):  
 Immediate: 4/5 independently and 5/5 with category cue Delayed: 3/5 independently and 5/5 with category cue Recall (paragraph):  
 Immediate: 2/4 independently and 3/4 with category cue Delayed: 4/4 independently Divergent naming: able to name ~12-13 items to category in 1 minute with minimal cueing, significant improvement from evaluation (patient named 3 during evaluation) Recall improving overall, however mild deficits continue to be present. Improved divergent naming, however requires min cueing for organization. Patient currently functioning below baseline and endorses memory impairments, although improving. Patient may benefit from continued speech therapy services to improve higher level cognitive functioning for safe return home. Will continue to follow. ?????? ? ? This section established at most recent assessment?????????? 
PROBLEM LIST (Impairments causing functional limitations): 1. Oral dysphagia 2. Cognition/memory REHABILITATION POTENTIAL FOR STATED GOALS: Good PLAN OF CARE:  
Patient will benefit from skilled intervention to address the following impairments. RECOMMENDATIONS AND PLANNED INTERVENTIONS (Benefits and precautions of therapy have been discussed with the patient.): 
· PO:  Mechanical soft · Liquids:  regular thin MEDICATIONS: 
· With liquid COMPENSATORY STRATEGIES/MODIFICATIONS INCLUDING: 
· None OTHER RECOMMENDATIONS (including follow up treatment recommendations): · Family training/education · Patient education RECOMMENDED DIET MODIFICATIONS DISCUSSED WITH: 
· Nursing · Patient FREQUENCY/DURATION: Continue to follow patient 3 times a week for duration of hospital stay to address above goals. RECOMMENDED REHABILITATION/EQUIPMENT: (at time of discharge pending progress): Due to the probability of continued deficits (see above) this patient will likely need continued skilled speech therapy after discharge. SUBJECTIVE:  
Alert/oriented x4. Brother present History of Present Injury/Illness: Ms. Krystal Harris  has no past medical history on file. .  She also  has no past surgical history on file. Present Symptoms:   
Pain Intensity 1: 0 Pain Location 1: Head 
Pain Orientation 1: Left Pain Intervention(s) 1: Medication (see MAR) Current Medications: No current facility-administered medications on file prior to encounter. No current outpatient medications on file prior to encounter. Current Dietary Status: mechanical soft/thin ? Social History/Home Situation:   
Home Environment: Apartment # Steps to Enter: 0 One/Two Story Residence: One story Living Alone: No 
Support Systems: Spouse/Significant Other/Partner Patient Expects to be Discharged to[de-identified] Unknown Current DME Used/Available at Home: None Tub or Shower Type: Tub/Shower combination OBJECTIVE:  
Respiratory Status: MRI/CT Results:1.  Stable intraparenchymal hemorrhage. No new hemorrhage or evidence for evolving hydrocephalus, or herniation is seen. Oral Motor Structure/Speech:  Oral-Motor Structure/Motor Speech Labial: No impairment Dentition: Intact Oral Hygiene: adequate Lingual: No impairment Cognitive and Communication Status: 
Neurologic State: Alert Orientation Level: Oriented X4 Cognition: Follows commands BEDSIDE SWALLOW EVALUATIONOral Assessment: P.O. Trials: 
Patient Position: upright in chair The patient was given the following:  
Consistency Presented: Solid; Thin liquid ORAL PHASE: 
Bolus Acceptance: No impairment Bolus Formation/Control: No impairment Propulsion: No impairment Oral Residue: None PHARYNGEAL PHASE: 
Initiation of Swallow: No impairment Laryngeal Elevation: Functional 
Aspiration Signs/Symptoms: None Vocal Quality: No impairment Pharyngeal Phase Characteristics: No impairment, issues, or problems OTHER OBSERVATIONS: 
Rate/bite size: WNL Comments:  
Endurance: WNL Comments: SPEECH-LANGUAGE COGNITIVE EVALUATION Motor Speech: Auditory Comprehension:  
  
 
Reading Comprehension:  
  
 
Verbal Expression: 
  Language: 
 Divergent naming: Impaired ~12 items (with minimal cueing) Written Expression:  
  
 
Neuro-Linguistics: 
Memory(5 items):  
 Immediate: 4/5 independently and 5/5 with category cue Delayed: 3/5 independently and 5/5 with category cue Memory(paragraph):  
 Immediate: 2/4 independently and 3/4 with category cue Delayed: 4/4 independently Pragmatics: 
  
  
Voice: 
  
  
  
  
  
Vocal Quality: No impairment Dysphagia Activities: Activities/Procedures listed utilized to improve progress in swallow strength, swallow timeliness, swallow function and swallow safety. Required no cueing to work toward diet advancement. Tool Used: Dysphagia Outcome and Severity Scale (JULIA) Score Comments Normal Diet  [] 7 With no strategies or extra time needed Functional Swallow  [] 6 May have mild oral or pharyngeal delay Mild Dysphagia 
  [] 5 Which may require one diet consistency restricted (those who demonstrate penetration which is entirely cleared on MBS would be included) Mild-Moderate Dysphagia  [] 4 With 1-2 diet consistencies restricted Moderate Dysphagia  [] 3 With 2 or more diet consistencies restricted Moderately Severe Dysphagia  [] 2 With partial PO strategies (trials with ST only) Severe Dysphagia  [] 1 With inability to tolerate any PO safely Score:  Initial: 5 Most Recent: X (Date: -- ) Interpretation of Tool: The Dysphagia Outcome and Severity Scale (JULIA) is a simple, easy-to-use, 7-point scale developed to systematically rate the functional severity of dysphagia based on objective assessment and make recommendations for diet level, independence level, and type of nutrition. Score 7 6 5 4 3 2 1 Modifier CH CI CJ CK CL CM CN ? Swallowing:  
  - CURRENT STATUS: CJ - 20%-39% impaired, limited or restricted  - GOAL STATUS:  CH - 0% impaired, limited or restricted  - D/C STATUS:  CH - 0% impaired, limited or restricted Payor: SELF PAY / Plan: [facility] SELF PAY / Product Type: Self Pay /  
 
________________________________________________________________________________________________ Safety: After treatment position/precautions: · Up in chair · Call light within reach · Family member present Treatment Assessment:  Participated in po trials for diet advancement and cognitive treatment as outlined above Progression/Medical Necessity: · No dysphagia treatment indicated as patient with normal swallow function · Patient is expected to demonstrate progress in cognitive ability to increase independence with activities of daily living and increase communication with family/caregivers. Compliance with Program/Exercises: Will assess as treatment progresses Reason for Continuation of Services/Other Comments: 
· no dysphagia treatment indicated · Patient continues to require skilled intervention due to cognitive linguistic deficits. Recommendations/Intent for next treatment session: \"Treatment next visit will focus on advancements to more challenging activities\". Total Treatment Duration: 
Time In: 1974 Time Out: [address], [address] [facility]

## 2019-01-02 NOTE — PROGRESS NOTES
Problem: Falls - Risk of 
Goal: *Absence of Falls Document Scout Dust Fall Risk and appropriate interventions in the flowsheet. Outcome: Progressing Towards Goal 
Fall Risk Interventions: 
Mobility Interventions: Patient to call before getting OOB Medication Interventions: Evaluate medications/consider consulting pharmacy, Patient to call before getting OOB Elimination Interventions: Call light in reach, Patient to call for help with toileting needs History of Falls Interventions: Consult care management for discharge planning, Investigate reason for fall Problem: Pressure Injury - Risk of 
Goal: *Prevention of pressure injury Document Henry Scale and appropriate interventions in the flowsheet. Outcome: Progressing Towards Goal 
Pressure Injury Interventions: Activity Interventions: Increase time out of bed, Pressure redistribution bed/mattress(bed type), PT/OT evaluation Nutrition Interventions: Document food/fluid/supplement intake Friction and Shear Interventions: HOB 30 degrees or less

## 2019-02-04 ENCOUNTER — HOSPITAL ENCOUNTER (OUTPATIENT)
Dept: CT IMAGING | Age: 29
Discharge: HOME | End: 2019-02-04
Attending: NEUROLOGICAL SURGERY
Payer: SUBSIDIZED

## 2019-02-04 DIAGNOSIS — I61.9 INTRAPARENCHYMAL HEMORRHAGE OF BRAIN (HCC): ICD-10-CM

## 2019-02-04 PROCEDURE — 70450 CT HEAD/BRAIN W/O DYE: CPT

## 2019-02-19 ENCOUNTER — HOSPITAL ENCOUNTER (OUTPATIENT)
Dept: INTERVENTIONAL RADIOLOGY/VASCULAR | Age: 29
Discharge: HOME | End: 2019-02-19
Attending: NEUROLOGICAL SURGERY
Payer: SUBSIDIZED

## 2019-02-19 VITALS
HEIGHT: 64 IN | OXYGEN SATURATION: 100 % | TEMPERATURE: 98.6 F | SYSTOLIC BLOOD PRESSURE: 125 MMHG | DIASTOLIC BLOOD PRESSURE: 85 MMHG | HEART RATE: 68 BPM | BODY MASS INDEX: 32.78 KG/M2 | WEIGHT: 192 LBS | RESPIRATION RATE: 18 BRPM

## 2019-02-19 DIAGNOSIS — I62.9 INTRACRANIAL HEMORRHAGE (HCC): ICD-10-CM

## 2019-02-19 LAB
ANION GAP SERPL CALC-SCNC: 4 MMOL/L (ref 7–16)
BUN SERPL-MCNC: 12 MG/DL (ref 6–23)
CALCIUM SERPL-MCNC: 8.4 MG/DL (ref 8.3–10.4)
CHLORIDE SERPL-SCNC: 109 MMOL/L (ref 98–107)
CO2 SERPL-SCNC: 27 MMOL/L (ref 21–32)
CREAT SERPL-MCNC: 1.1 MG/DL (ref 0.6–1)
ERYTHROCYTE [DISTWIDTH] IN BLOOD BY AUTOMATED COUNT: 12.5 % (ref 11.9–14.6)
GLUCOSE SERPL-MCNC: 88 MG/DL (ref 65–100)
HCG UR QL: NEGATIVE
HCT VFR BLD AUTO: 40.3 % (ref 35.8–46.3)
HGB BLD-MCNC: 13.6 G/DL (ref 11.7–15.4)
INR BLD: 1 (ref 0.9–1.2)
MCH RBC QN AUTO: 31.8 PG (ref 26.1–32.9)
MCHC RBC AUTO-ENTMCNC: 33.7 G/DL (ref 31.4–35)
MCV RBC AUTO: 94.2 FL (ref 79.6–97.8)
NRBC # BLD: 0 K/UL (ref 0–0.2)
PLATELET # BLD AUTO: 153 K/UL (ref 150–450)
PMV BLD AUTO: 11.7 FL (ref 9.4–12.3)
POTASSIUM SERPL-SCNC: 4.1 MMOL/L (ref 3.5–5.1)
PT BLD: 12.3 SECS (ref 9.6–11.6)
RBC # BLD AUTO: 4.28 M/UL (ref 4.05–5.2)
SODIUM SERPL-SCNC: 140 MMOL/L (ref 136–145)
WBC # BLD AUTO: 3.2 K/UL (ref 4.3–11.1)

## 2019-02-19 PROCEDURE — C1769 GUIDE WIRE: HCPCS

## 2019-02-19 PROCEDURE — C1894 INTRO/SHEATH, NON-LASER: HCPCS

## 2019-02-19 PROCEDURE — 85610 PROTHROMBIN TIME: CPT

## 2019-02-19 PROCEDURE — 36227 PLACE CATH XTRNL CAROTID: CPT | Performed by: NEUROLOGICAL SURGERY

## 2019-02-19 PROCEDURE — C1760 CLOSURE DEV, VASC: HCPCS

## 2019-02-19 PROCEDURE — 74011250636 HC RX REV CODE- 250/636

## 2019-02-19 PROCEDURE — 74011636320 HC RX REV CODE- 636/320: Performed by: NEUROLOGICAL SURGERY

## 2019-02-19 PROCEDURE — 85027 COMPLETE CBC AUTOMATED: CPT

## 2019-02-19 PROCEDURE — 36226 PLACE CATH VERTEBRAL ART: CPT | Performed by: NEUROLOGICAL SURGERY

## 2019-02-19 PROCEDURE — 81025 URINE PREGNANCY TEST: CPT

## 2019-02-19 PROCEDURE — 36224 PLACE CATH CAROTD ART: CPT | Performed by: NEUROLOGICAL SURGERY

## 2019-02-19 PROCEDURE — 80048 BASIC METABOLIC PNL TOTAL CA: CPT

## 2019-02-19 PROCEDURE — 74011250637 HC RX REV CODE- 250/637: Performed by: NURSE PRACTITIONER

## 2019-02-19 PROCEDURE — 36224 PLACE CATH CAROTD ART: CPT

## 2019-02-19 PROCEDURE — 74011250636 HC RX REV CODE- 250/636: Performed by: NEUROLOGICAL SURGERY

## 2019-02-19 PROCEDURE — C1887 CATHETER, GUIDING: HCPCS

## 2019-02-19 RX ORDER — IODIXANOL 320 MG/ML
1-300 INJECTION, SOLUTION INTRAVASCULAR
Status: COMPLETED | OUTPATIENT
Start: 2019-02-19 | End: 2019-02-19

## 2019-02-19 RX ORDER — FENTANYL CITRATE 50 UG/ML
12.5-1 INJECTION, SOLUTION INTRAMUSCULAR; INTRAVENOUS
Status: DISCONTINUED | OUTPATIENT
Start: 2019-02-19 | End: 2019-02-23 | Stop reason: HOSPADM

## 2019-02-19 RX ORDER — ONDANSETRON 4 MG/1
4 TABLET, ORALLY DISINTEGRATING ORAL ONCE
Status: COMPLETED | OUTPATIENT
Start: 2019-02-19 | End: 2019-02-19

## 2019-02-19 RX ORDER — SODIUM CHLORIDE 9 MG/ML
25 INJECTION, SOLUTION INTRAVENOUS CONTINUOUS
Status: DISCONTINUED | OUTPATIENT
Start: 2019-02-19 | End: 2019-02-23 | Stop reason: HOSPADM

## 2019-02-19 RX ORDER — ACETAMINOPHEN 325 MG/1
650 TABLET ORAL
Status: DISCONTINUED | OUTPATIENT
Start: 2019-02-19 | End: 2019-02-23 | Stop reason: HOSPADM

## 2019-02-19 RX ORDER — MIDAZOLAM HYDROCHLORIDE 1 MG/ML
.25-2 INJECTION, SOLUTION INTRAMUSCULAR; INTRAVENOUS
Status: DISCONTINUED | OUTPATIENT
Start: 2019-02-19 | End: 2019-02-23 | Stop reason: HOSPADM

## 2019-02-19 RX ORDER — LIDOCAINE HYDROCHLORIDE 20 MG/ML
40-120 INJECTION, SOLUTION INFILTRATION; PERINEURAL ONCE
Status: COMPLETED | OUTPATIENT
Start: 2019-02-19 | End: 2019-02-19

## 2019-02-19 RX ORDER — HYDROCODONE BITARTRATE AND ACETAMINOPHEN 7.5; 325 MG/1; MG/1
1 TABLET ORAL ONCE
Status: COMPLETED | OUTPATIENT
Start: 2019-02-19 | End: 2019-02-19

## 2019-02-19 RX ADMIN — SODIUM CHLORIDE 25 ML/HR: 900 INJECTION, SOLUTION INTRAVENOUS at 08:00

## 2019-02-19 RX ADMIN — IODIXANOL 125 ML: 320 INJECTION, SOLUTION INTRAVASCULAR at 08:45

## 2019-02-19 RX ADMIN — ONDANSETRON 4 MG: 4 TABLET, ORALLY DISINTEGRATING ORAL at 10:27

## 2019-02-19 RX ADMIN — MIDAZOLAM HYDROCHLORIDE 1 MG: 1 INJECTION, SOLUTION INTRAMUSCULAR; INTRAVENOUS at 08:18

## 2019-02-19 RX ADMIN — FENTANYL CITRATE 50 MCG: 50 INJECTION, SOLUTION INTRAMUSCULAR; INTRAVENOUS at 08:43

## 2019-02-19 RX ADMIN — LIDOCAINE HYDROCHLORIDE 90 MG: 20 INJECTION, SOLUTION INFILTRATION; PERINEURAL at 08:14

## 2019-02-19 RX ADMIN — FENTANYL CITRATE 50 MCG: 50 INJECTION, SOLUTION INTRAMUSCULAR; INTRAVENOUS at 08:18

## 2019-02-19 RX ADMIN — FENTANYL CITRATE 50 MCG: 50 INJECTION, SOLUTION INTRAMUSCULAR; INTRAVENOUS at 08:10

## 2019-02-19 RX ADMIN — MIDAZOLAM HYDROCHLORIDE 1 MG: 1 INJECTION, SOLUTION INTRAMUSCULAR; INTRAVENOUS at 08:10

## 2019-02-19 RX ADMIN — HYDROCODONE BITARTRATE AND ACETAMINOPHEN 1 TABLET: 7.5; 325 TABLET ORAL at 10:27

## 2019-02-19 NOTE — PROGRESS NOTES
Patient has c/o 9/10 pain to right groin, area soft to touch, no drainage noted, pulse strong. Spoke with Sania Richards, NP received order for Norco 7.5mg po once and Zofran 4mg PO once. Medication administered.

## 2019-02-19 NOTE — PROGRESS NOTES
Discharge complete. Discharge instructions reviewed with pt. Time for questions allowed. Pt denies any questions at this time. Dressing to groin noted to be clean, dry, and intact. Pt assisted to front of hospital via wheelchair. Visit Vitals  /85   Pulse 68   Temp 98.6 °F (37 °C)   Resp 18   Ht 5' 4\" (1.626 m)   Wt 87.1 kg (192 lb)   SpO2 100%   Breastfeeding?  No   BMI 32.96 kg/m²

## 2019-02-19 NOTE — PROGRESS NOTES
TRANSFER - OUT REPORT:    Verbal report given to Pau AGUERO(name) on [address]  being transferred to IR Recovery room 3(unit) for routine progression of care       Report consisted of patients Situation, Background, Assessment and   Recommendations(SBAR). Information from the following report(s) SBAR, Kardex, Procedure Summary, Intake/Output and MAR was reviewed with the receiving nurse. Lines:   Peripheral IV 02/19/19 Right Antecubital (Active)   Site Assessment Clean, dry, & intact 2/19/2019  7:24 AM   Phlebitis Assessment 0 2/19/2019  7:24 AM   Infiltration Assessment 0 2/19/2019  7:24 AM   Dressing Status Clean, dry, & intact 2/19/2019  7:24 AM   Dressing Type Transparent 2/19/2019  7:24 AM   Hub Color/Line Status Pink 2/19/2019  7:24 AM       Peripheral IV 02/19/19 Left;Posterior Hand (Active)        Opportunity for questions and clarification was provided.       Patient transported with:   Registered Nurse

## 2019-02-19 NOTE — OP NOTES
Procedure: Cerebral angiogram  Surgeon: Dr. Yasmany Dominguez  Assistant: Chan Martinez NP  Pre-op Dx:  Left ICH  Post-op Dx: same  Anesthesia: Conscious sedation with fentanyl and versed  Complications: None  Findings: No obvious vascular abnormality

## 2019-02-19 NOTE — DISCHARGE INSTRUCTIONS
[address] [facility],4Th [address]  Cerebrovascular and Stroke Center            Cerebral Angiography Discharge Instructions    General Information: This test is done to evaluate the blood vessels in your brain and neck. Following the procedure, you will be asked to lie flat on your back for 2-6 hours after the procedure to prevent bleeding complications. The physician may use an arterial closure device that will decrease your recovery time. If this is used, your nurse will explain the difference in recovery. We have no way to determine if we can use a closure device until the procedure is started. We will let you know when you wake up after the procedure. Home Care Instructions: You can resume your regular diet. Do not shower, bathe, swim, drink alcohol, or make any important legal decisions in the next 48 hours. You may drive after 24 hours. Do not lift anything heavier than a gallon of milk for 2 days. Watch the site carefully for signs of infection, like fever, drainage, redness, and/or swelling. If you take Glucophage (Metformin) for diabetes, do not take it for the next 48 hours. If you were asked to hold any blood thinners prior to the procedure, you may restart that medicine the day after the procedure is completed or when instructed by your physician. Recline your car seat for the ride home. Call If: You should call your Physician and/or the Radiology Nurse if you have any signs of infection like fever, drainage, redness, and/or swelling. If the puncture site should ooze, please call. Also call if you have any pain, decreased sensation, numbness, tingling, swelling, or change in color to the affected extremity. SEEK IMMEDIATE MEDICAL CARE IF YOUR PUNCTURE SITE STARTS TO BLEED. APPLY ENOUGH FIRM PRESSURE TO THE SITE WITH THE TIPS OF YOUR FINGERS TO STOP THE BLEEDING. Arterial bleeding is a medical emergency and should be evaluated immediately.     Follow-Up Instructions:  Please follow up with your ordering doctor as he/she has requested. To Reach Us: If you have any questions about your procedure, please call Jennifer Muller NP at [phone]. Interventional Radiology General Nurse Discharge    After general anesthesia or intravenous sedation, for 24 hours or while taking prescription Narcotics:  · Limit your activities  · Do not drive and operate hazardous machinery  · Do not make important personal or business decisions  · Do  not drink alcoholic beverages  · If you have not urinated within 8 hours after discharge, please contact your surgeon on call. * Please give a list of your current medications to your Primary Care Provider. * Please update this list whenever your medications are discontinued, doses are     changed, or new medications (including over-the-counter products) are added. * Please carry medication information at all times in case of emergency situations. These are general instructions for a healthy lifestyle:    No smoking/ No tobacco products/ Avoid exposure to second hand smoke  Surgeon General's Warning:  Quitting smoking now greatly reduces serious risk to your health. Obesity, smoking, and sedentary lifestyle greatly increases your risk for illness  A healthy diet, regular physical exercise & weight monitoring are important for maintaining a healthy lifestyle    You may be retaining fluid if you have a history of heart failure or if you experience any of the following symptoms:  Weight gain of 3 pounds or more overnight or 5 pounds in a week, increased swelling in our hands or feet or shortness of breath while lying flat in bed. Please call your doctor as soon as you notice any of these symptoms; do not wait until your next office visit.     Recognize signs and symptoms of STROKE:  F-face looks uneven    A-arms unable to move or move unevenly    S-speech slurred or non-existent    T-time-call 911 as soon as signs and symptoms begin-DO NOT go       Back to bed or wait to see if you get better-TIME IS BRAIN.

## 2019-02-19 NOTE — PROGRESS NOTES
TRANSFER - IN REPORT:    Verbal report received from [address] RN(name) on [address]  being received from IR(unit) for routine progression of care      Report consisted of patients Situation, Background, Assessment and   Recommendations(SBAR). Information from the following report(s) OR Summary and MAR was reviewed with the receiving nurse. Opportunity for questions and clarification was provided. Assessment completed upon patients arrival to unit and care assumed.

## 2019-02-19 NOTE — PROGRESS NOTES
History and Physical    Patient: Manuel Huynh MRN: [id number]  SSN: [id number]    YOB: 1990  Age: 25 y.o. Sex: female      Subjective:      Manuel Huynh is a 25 y.o. female with history of ICH/IVH 12-18. Pt had originally presented to [address] ED with left sided HA that was worst of life, she was admitted and imaging did not show any obvious vascular abnormality, but Dr. Raymond Tyler was consulted and pt seen in outpt clinic 2-6-19. Pt is here today for diagnostic cerebral angiogram to evaluate for any underlying vascular abnormality. Pt is aox3 on exam and follows commands. Pt admits to continued smoking, pt educated on need to stop and how smoking can affect her health and vascular system. Consent signed. Dr. Raymond Tyler has seen pt at bedside this am.      History reviewed. No pertinent past medical history. History reviewed. No pertinent surgical history. History reviewed. No pertinent family history. Social History     Tobacco Use    Smoking status: Current Every Day Smoker     Years: 0.50    Smokeless tobacco: Never Used   Substance Use Topics    Alcohol use: Yes     Comment: occasional- last drink 12/25/2018      Prior to Admission medications    Medication Sig Start Date End Date Taking? Authorizing Provider   ibuprofen (MOTRIN) 600 mg tablet Take 600 mg by mouth. 1/20/17   Provider, Historical   levETIRAcetam (KEPPRA) 500 mg tablet Take  by mouth two (2) times a day. Provider, Historical   HYDROcodone-acetaminophen (NORCO) 5-325 mg per tablet Take 1 Tab by mouth every six (6) hours as needed. Max Daily Amount: 4 Tabs. 1/2/19   Neftali Lo NP        No Known Allergies    Review of Systems:  Pt denies any pain or discomfort this am. Denies any CP,SOB ro neuro changes.     Objective:     Vitals:    02/15/19 1010 02/19/19 0658 02/19/19 0700   BP:  113/66    Pulse:   86   Resp:   16   Temp:  98.6 °F (37 °C)    SpO2:  100%    Weight: 192 lb (87.1 kg)  192 lb (87.1 kg) Height: 5' 4\" (1.626 m)  5' 4\" (1.626 m)        Physical Exam:  General:  Alert, cooperative, no distress, appears stated age. HEENT: Supple, symmetrical, trachea midline, no adenopathy, thyroid: no enlargment/tenderness/nodules, no carotid bruit and no JVD. Lungs:   Clear to auscultation bilaterally. Heart:  Regular rate and rhythm, S1, S2  normal, no murmur, click, rub or gallop. Abdomen:   Soft, non-tender. Bowel sounds normal. No masses,  No organomegaly. Extremities: Extremities normal, atraumatic, no cyanosis or edema. Pulses: 2+ and symmetric all extremities. Skin: Skin color, texture, turgor normal. No rashes or lesions   Neurologic: CNII-XII intact. Normal strength, sensation and reflexes throughout. Assessment:     Hospital Problems  Date Reviewed: 2/6/2019    None          Plan:     1. Diagnostic Cerebral angiogram this am with DR. Velvet Lopez. 2. Dc home if no acute changes.      Signed By: Lux Whipple NP     February 19, 2019

## 2019-09-05 NOTE — ED PROVIDER NOTES
Patient seen at [facility] and transferred here for left ventricular head bleed. Dr. Oneal Montana is seeing directly and admitting to the hospital to the hospitalist.  Patient never seen by me. Headache           No past medical history on file. No past surgical history on file. No family history on file. Social History     Socioeconomic History    Marital status: SINGLE     Spouse name: Not on file    Number of children: Not on file    Years of education: Not on file    Highest education level: Not on file   Social Needs    Financial resource strain: Not on file    Food insecurity - worry: Not on file    Food insecurity - inability: Not on file    Transportation needs - medical: Not on file   [employer] needs - non-medical: Not on file   Occupational History    Not on file   Tobacco Use    Smoking status: Current Every Day Smoker     Years: 0.50    Smokeless tobacco: Never Used   Substance and Sexual Activity    Alcohol use: Yes     Comment: occasional    Drug use: No    Sexual activity: Not on file   Other Topics Concern    Not on file   Social History Narrative    Not on file         ALLERGIES: Patient has no known allergies. Review of Systems   Neurological: Positive for headaches.        Vitals:    12/27/18 1349 12/27/18 1355 12/27/18 1416   BP: 109/74 117/69 110/57   Pulse: 66 (!) 59 (!) 57   Resp: 16 18 22   SpO2: 99% 100% 100%   Weight: 83.9 kg (185 lb)     Height: 5' 4\" (1.626 m)              Physical Exam     MDM  Number of Diagnoses or Management Options  Diagnosis management comments: Patient is a direct admit to the hospitalist.         Procedures
[FreeTextEntry1] : This is a 71 y/o woman with hx including hypothyroidism, HLD, osteopenia, osteoarthritis, Torn meniscus left side 10 years ago with orthopedic repair, who presents for f/u of a +CHRIS 1: homogenous and osteopenia.  \par \par The CHRIS was drawn as part of liver workup.\par Liver tests normalized after some medication changes. \par She was noted to also have a ferritin in the 400's range. She denies any fevers, rash. \par \par She has no active complaints today except wanting to review her DEXA.  \par She no longer has any toe pain/swelling.\par \par She has not taken the tizanidine because she saw online that it was used for MS. Reports her pain is 0/10 in intensity with 10 minutes of AM stiffness.  \par \par Her DEXA 9/3/19 has returned with T score -1.5, FRAX 18/3.6%.  She is open to treatment for the osteopenia.  \par She denies any recent falls or fractures.  \par Hysterectomy 38, one ovary\par No chronic steroid use\par right wrist fracture 2 years ago.\par \par \par 8/2/18\par CHRIS 1:160 homogenous\par ASMA 1:20. \par 12/5/18\par CHRIS 1:80 homogenous\par RF/CCP/DSDNA/DEEDEE/Sjogren's Ab WNL\par ESR/CRP WNL\par \par The patient denies fevers, chills, hair loss, photosensitivity, oral sores, dry eyes, dry mouth, rashes, pleuritic chest pain, SOB, abdominal pain, LE edema, or triphasic color changes in the hands/feet, joint pain, joint stiffness, or joint swelling. Patient denies skin tightening, dysphagia. Patient denies muscle weakness/muscle pain. Denies hx of eye inflammation. No history of blood clots or miscarriages.\par \par \par

## 2022-03-19 PROBLEM — I62.9 INTRACRANIAL BLEED (HCC): Status: ACTIVE | Noted: 2018-12-27

## 2022-04-03 ENCOUNTER — HOSPITAL ENCOUNTER (EMERGENCY)
Age: 32
Discharge: HOME | End: 2022-04-03
Attending: STUDENT IN AN ORGANIZED HEALTH CARE EDUCATION/TRAINING PROGRAM

## 2022-04-03 ENCOUNTER — APPOINTMENT (OUTPATIENT)
Dept: MRI IMAGING | Age: 32
End: 2022-04-03
Attending: PHYSICIAN ASSISTANT

## 2022-04-03 ENCOUNTER — APPOINTMENT (OUTPATIENT)
Dept: CT IMAGING | Age: 32
End: 2022-04-03
Attending: PHYSICIAN ASSISTANT

## 2022-04-03 ENCOUNTER — APPOINTMENT (OUTPATIENT)
Dept: GENERAL RADIOLOGY | Age: 32
End: 2022-04-03
Attending: PHYSICIAN ASSISTANT

## 2022-04-03 VITALS
RESPIRATION RATE: 18 BRPM | DIASTOLIC BLOOD PRESSURE: 72 MMHG | HEART RATE: 83 BPM | HEIGHT: 64 IN | BODY MASS INDEX: 32.96 KG/M2 | OXYGEN SATURATION: 100 % | SYSTOLIC BLOOD PRESSURE: 102 MMHG | TEMPERATURE: 97.8 F
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
## 2022-04-03 DIAGNOSIS — R51.9 NONINTRACTABLE HEADACHE, UNSPECIFIED CHRONICITY PATTERN, UNSPECIFIED HEADACHE TYPE: ICD-10-CM

## 2022-04-03 DIAGNOSIS — M54.50 ACUTE LOW BACK PAIN WITHOUT SCIATICA, UNSPECIFIED BACK PAIN LATERALITY: ICD-10-CM

## 2022-04-03 LAB — HCG UR QL: NEGATIVE

## 2022-04-03 PROCEDURE — 72100 X-RAY EXAM L-S SPINE 2/3 VWS: CPT

## 2022-04-03 PROCEDURE — 81025 URINE PREGNANCY TEST: CPT

## 2022-04-03 PROCEDURE — 74011250636 HC RX REV CODE- 250/636: Performed by: PHYSICIAN ASSISTANT

## 2022-04-03 PROCEDURE — 73030 X-RAY EXAM OF SHOULDER: CPT

## 2022-04-03 PROCEDURE — 99284 EMERGENCY DEPT VISIT MOD MDM: CPT

## 2022-04-03 PROCEDURE — 70551 MRI BRAIN STEM W/O DYE: CPT

## 2022-04-03 PROCEDURE — 96372 THER/PROPH/DIAG INJ SC/IM: CPT

## 2022-04-03 PROCEDURE — 70450 CT HEAD/BRAIN W/O DYE: CPT

## 2022-04-03 RX ORDER — MORPHINE SULFATE 4 MG/ML
4 INJECTION INTRAVENOUS ONCE
Status: COMPLETED | OUTPATIENT
Start: 2022-04-03 | End: 2022-04-03

## 2022-04-03 RX ORDER — METHOCARBAMOL 500 MG/1
1000 TABLET, FILM COATED ORAL 3 TIMES DAILY
Qty: 30 TABLET | Refills: 0 | Status: SHIPPED | OUTPATIENT
Start: 2022-04-03 | End: 2022-04-08

## 2022-04-03 RX ADMIN — MORPHINE SULFATE 4 MG: 4 INJECTION INTRAVENOUS at 10:48

## 2022-04-03 NOTE — ED NOTES
I have reviewed discharge instructions with the patient. The patient verbalized understanding. Patient left ED via Discharge Method: ambulatory to Home with friend    Opportunity for questions and clarification provided. Patient give e scripts. To continue your aftercare when you leave the hospital, you may receive an automated call from our care team to check in on how you are doing. This is a free service and part of our promise to provide the best care and service to meet your aftercare needs.  If you have questions, or wish to unsubscribe from this service please call [phone]. Thank you for Choosing our [facility] Emergency Department.
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
## 2024-01-13 ENCOUNTER — HOSPITAL ENCOUNTER (EMERGENCY)
Age: 34
Discharge: HOME | End: 2024-01-13
Payer: COMMERCIAL

## 2024-01-13 VITALS
HEIGHT: 64 IN | TEMPERATURE: 98.6 F | SYSTOLIC BLOOD PRESSURE: 109 MMHG | DIASTOLIC BLOOD PRESSURE: 81 MMHG | WEIGHT: 216 LBS | RESPIRATION RATE: 18 BRPM | BODY MASS INDEX: 36.88 KG/M2 | OXYGEN SATURATION: 98 % | HEART RATE: 99 BPM

## 2024-01-13 DIAGNOSIS — L05.01 PILONIDAL ABSCESS: Primary | ICD-10-CM

## 2024-01-13 PROCEDURE — 99283 EMERGENCY DEPT VISIT LOW MDM: CPT

## 2024-01-13 PROCEDURE — 6370000000 HC RX 637 (ALT 250 FOR IP)

## 2024-01-13 RX ORDER — IBUPROFEN 800 MG/1
800 TABLET ORAL
Status: COMPLETED | OUTPATIENT
Start: 2024-01-13 | End: 2024-01-13

## 2024-01-13 RX ORDER — HYDROCODONE BITARTRATE AND ACETAMINOPHEN 5; 325 MG/1; MG/1
1 TABLET ORAL EVERY 6 HOURS PRN
Qty: 10 TABLET | Refills: 0 | Status: SHIPPED | OUTPATIENT
Start: 2024-01-13 | End: 2024-01-16

## 2024-01-13 RX ORDER — CLINDAMYCIN HYDROCHLORIDE 150 MG/1
450 CAPSULE ORAL 3 TIMES DAILY
Qty: 63 CAPSULE | Refills: 0 | Status: SHIPPED | OUTPATIENT
Start: 2024-01-13 | End: 2024-01-20

## 2024-01-13 RX ADMIN — IBUPROFEN 800 MG: 800 TABLET, FILM COATED ORAL at 21:13

## 2024-01-13 ASSESSMENT — LIFESTYLE VARIABLES
HOW MANY STANDARD DRINKS CONTAINING ALCOHOL DO YOU HAVE ON A TYPICAL DAY: PATIENT DOES NOT DRINK
HOW OFTEN DO YOU HAVE A DRINK CONTAINING ALCOHOL: NEVER

## 2024-01-13 ASSESSMENT — ENCOUNTER SYMPTOMS: RECTAL PAIN: 0

## 2024-01-13 ASSESSMENT — PAIN SCALES - GENERAL
PAINLEVEL_OUTOF10: 7
PAINLEVEL_OUTOF10: 7

## 2024-01-13 ASSESSMENT — PAIN - FUNCTIONAL ASSESSMENT: PAIN_FUNCTIONAL_ASSESSMENT: 0-10

## 2024-01-13 ASSESSMENT — PAIN DESCRIPTION - LOCATION: LOCATION: BUTTOCKS

## 2024-01-13 ASSESSMENT — PAIN DESCRIPTION - DESCRIPTORS: DESCRIPTORS: ACHING

## 2024-01-14 NOTE — DISCHARGE INSTRUCTIONS
Please take clindamycin 3 times daily for the next 7 days.  This medication may give you diarrhea so please take a probiotic with it.  Use Tylenol or ibuprofen for less severe pain.  Norco for more severe pain.  Follow-up with general surgery as listed on this document.  Perform warm soaks and allow the wound to drain.  Return for any fevers chills flulike symptoms or other concerns.

## 2024-01-14 NOTE — ED PROVIDER NOTES
Emergency Department Provider Note       PCP: No primary care provider on file.   Age: 33 y.o.   Sex: female     DISPOSITION Decision To Discharge 01/13/2024 09:00:31 PM       ICD-10-CM    1. Pilonidal abscess  L05.01 [facility]     HYDROcodone-acetaminophen (NORCO) 5-325 MG per tablet          Medical Decision Making     Complexity of Problems Addressed:  1 or more acute illnesses that pose a threat to life or bodily function.     Data Reviewed and Analyzed:  I independently ordered and reviewed each unique test.  I reviewed external records: provider visit note from PCP.  I reviewed external records: previous imaging study including radiologist interpretation.           Discussion of management or test interpretation.  33-year-old female presents with gluteal cleft area pain.  Apparent pilonidal abscess.  Patient reports no rectal pain.  No pain on defecation.  I do not suspect a perirectal involvement.  Discussed with patient the risks and benefits of draining the abscess tonight.  Patient declined drainage here in the ED tonight.  I will send her home on p.o. clindamycin and refer her to surgery for follow-up.       Risk of Complications and/or Morbidity of Patient Management:  Prescription drug management performed.  Patient was discharged risks and benefits of hospitalization were considered.  Shared medical decision making was utilized in creating the patients health plan today.         History       33-year-old female with past medical history of intracranial bleed presents with gluteal cleft pain.  Patient states that she had pus from her gluteal cleft area in November of last year.  Patient states she took an antibiotic from a friend but does not know the name.  Infection flared back up last week.  Patient states it is painful to sit and that she took 2 different types of antibiotics this week from friends with no improvement.  Patient denies fever and and chills or

## 2024-01-14 NOTE — ED NOTES
I have reviewed discharge instructions with the patient.  The patient verbalized understanding.    Patient left ED via Discharge Method: ambulatory to Home with self    Opportunity for questions and clarification provided.       Patient given 2 scripts.         To continue your aftercare when you leave the hospital, you may receive an automated call from our care team to check in on how you are doing.  This is a free service and part of our promise to provide the best care and service to meet your aftercare needs.” If you have questions, or wish to unsubscribe from this service please call [phone].  Thank you for Choosing our [facility] Emergency Department.       Lindsey Boudreaux, RN  01/13/24 6722

## 2024-01-14 NOTE — ED TRIAGE NOTES
Pt to ED ambulatory to triage with c/o a possible infection to what was described to her by a friend as \"a cut\" just above her rectum. Pt states this began in December but she took an antibiotic from a friend and it went away. Then it came back a few weeks later and had purulent drainage and she treated it with hydrogen peroxide and vaseline and it helped. Stated it came back a few days ago and has gradually gotten worse. States going to the same friend for antibiotics but this time the antibiotics are not helping. Pt states no fever/chills at home. Pt has been unable to visualize the wound herself.

## 2024-02-07 ENCOUNTER — OFFICE VISIT (OUTPATIENT)
Dept: SURGERY | Age: 34
End: 2024-02-07
Payer: COMMERCIAL

## 2024-02-07 VITALS
HEIGHT: 64 IN | BODY MASS INDEX: 34.83 KG/M2 | OXYGEN SATURATION: 97 % | HEART RATE: 87 BPM | SYSTOLIC BLOOD PRESSURE: 120 MMHG | DIASTOLIC BLOOD PRESSURE: 80 MMHG | WEIGHT: 204 LBS

## 2024-02-07 DIAGNOSIS — L08.89 PILONIDAL DISEASE OF NATAL CLEFT: Primary | ICD-10-CM

## 2024-02-07 PROCEDURE — 99203 OFFICE O/P NEW LOW 30 MIN: CPT | Performed by: SURGERY

## 2024-02-07 NOTE — PROGRESS NOTES
axial diffusion-weighted,  sagittal T1 and  coronal gradient-echo scans were performed.    Findings:    There is residual chronic hemosiderin deposition in the left peritrigonal white  matter decreased from prior exam. No surrounding edema is demonstrated.   No  evidence of restricted diffusion is seen to suggest acute ischemia.    There are no abnormal extra-axial fluid collections. No evidence of mass or mass  effect is seen.  Expected flow voids are maintained in the major intracranial  vessels.    The cerebellum and brainstem are unremarkable. There is no evidence of Chiari  malformation.    The ventricular system and CSF containing spaces are unremarkable in appearance.    Visualized extracranial soft tissues are unremarkable.    The paranasal sinuses are well pneumatized and aerated.    Impression  1.  Remote left peritrigonal intraparenchymal hemorrhage with residual  hemosiderin deposition. No evidence of acute intracranial abnormality.    CPT code(s) 19931          REVIEW OF SYSTEMS:    Constitutional: no weight loss, no fevers or night sweats    Eyes: No pain, redness or visual changes.    Ears, Nose, Mouth: No oral or nasal ulcers.    Cardiovascular: No recent chest pain, irregular heartbeat, or dizziness.    Respiratory: No cough, pleuritic pain, or dyspnea on exertion    Gastrointestinal: as above.    Genitourinary: No dysuria, hematuria    Neurological: No new weakness, numbness, headaches, or seizures.    Psychiatric: No vegetative symptoms or depression.    Endocrine: No heat or cold intolerance, no polyuria or polydipsia.    Hematologic/Lymphatic: No lymphadenopathy, easy bruising or bleeding.    The remainder of the review of symptoms was negative, except as per hpi.    The following portions of the patient's history were reviewed and updated as appropriate: allergies, current medications, past family history, past medical history, past social history, past surgical history and problem

## 2024-04-16 ENCOUNTER — HOSPITAL ENCOUNTER (EMERGENCY)
Age: 34
Discharge: HOME | End: 2024-04-16
Attending: EMERGENCY MEDICINE
Payer: COMMERCIAL

## 2024-04-16 ENCOUNTER — APPOINTMENT (OUTPATIENT)
Dept: CT IMAGING | Age: 34
End: 2024-04-16
Payer: COMMERCIAL

## 2024-04-16 VITALS
BODY MASS INDEX: 36.7 KG/M2 | DIASTOLIC BLOOD PRESSURE: 67 MMHG | WEIGHT: 215 LBS | TEMPERATURE: 98.4 F | HEART RATE: 78 BPM | HEIGHT: 64 IN | SYSTOLIC BLOOD PRESSURE: 111 MMHG | RESPIRATION RATE: 18 BRPM | OXYGEN SATURATION: 99 %

## 2024-04-16 DIAGNOSIS — S39.012A STRAIN OF LUMBAR REGION, INITIAL ENCOUNTER: ICD-10-CM

## 2024-04-16 DIAGNOSIS — S16.1XXA STRAIN OF NECK MUSCLE, INITIAL ENCOUNTER: Primary | ICD-10-CM

## 2024-04-16 LAB
BILIRUB UR QL: NEGATIVE
GLUCOSE UR QL STRIP.AUTO: NEGATIVE MG/DL
HCG UR QL: NEGATIVE
KETONES UR-MCNC: NEGATIVE MG/DL
LEUKOCYTE ESTERASE UR QL STRIP: NEGATIVE
NITRITE UR QL: NEGATIVE
PH UR: 8.5 (ref 5–9)
PROT UR QL: NEGATIVE MG/DL
RBC # UR STRIP: NEGATIVE
SERVICE CMNT-IMP: ABNORMAL
SP GR UR: 1.02 (ref 1–1.02)
UROBILINOGEN UR QL: 0.2 EU/DL (ref 0.2–1)

## 2024-04-16 PROCEDURE — 72131 CT LUMBAR SPINE W/O DYE: CPT

## 2024-04-16 PROCEDURE — 72125 CT NECK SPINE W/O DYE: CPT

## 2024-04-16 PROCEDURE — 81025 URINE PREGNANCY TEST: CPT

## 2024-04-16 PROCEDURE — 99284 EMERGENCY DEPT VISIT MOD MDM: CPT

## 2024-04-16 PROCEDURE — 70450 CT HEAD/BRAIN W/O DYE: CPT

## 2024-04-16 PROCEDURE — 81003 URINALYSIS AUTO W/O SCOPE: CPT

## 2024-04-16 RX ORDER — KETOROLAC TROMETHAMINE 10 MG/1
10 TABLET, FILM COATED ORAL EVERY 6 HOURS PRN
Qty: 20 TABLET | Refills: 0 | Status: SHIPPED | OUTPATIENT
Start: 2024-04-16

## 2024-04-16 ASSESSMENT — LIFESTYLE VARIABLES
HOW MANY STANDARD DRINKS CONTAINING ALCOHOL DO YOU HAVE ON A TYPICAL DAY: 1 OR 2
HOW OFTEN DO YOU HAVE A DRINK CONTAINING ALCOHOL: MONTHLY OR LESS

## 2024-04-16 ASSESSMENT — PAIN DESCRIPTION - ORIENTATION: ORIENTATION: RIGHT;LEFT

## 2024-04-16 ASSESSMENT — PAIN DESCRIPTION - LOCATION: LOCATION: BACK;SHOULDER

## 2024-04-16 ASSESSMENT — PAIN SCALES - GENERAL: PAINLEVEL_OUTOF10: 7

## 2024-04-16 ASSESSMENT — PAIN - FUNCTIONAL ASSESSMENT: PAIN_FUNCTIONAL_ASSESSMENT: 0-10

## 2024-04-16 ASSESSMENT — ENCOUNTER SYMPTOMS
BACK PAIN: 1
SHORTNESS OF BREATH: 0
VOMITING: 0
CONTUSION: 0
ABDOMINAL PAIN: 0

## 2024-04-16 ASSESSMENT — PAIN DESCRIPTION - DESCRIPTORS: DESCRIPTORS: ACHING
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
## 2024-04-16 NOTE — ED NOTES
I have reviewed discharge instructions with the patient.  The patient verbalized understanding.    Patient left ED via Discharge Method: ambulatory to Home with self.    Opportunity for questions and clarification provided.       Patient given 1 scripts.         To continue your aftercare when you leave the hospital, you may receive an automated call from our care team to check in on how you are doing.  This is a free service and part of our promise to provide the best care and service to meet your aftercare needs.” If you have questions, or wish to unsubscribe from this service please call [phone].  Thank you for Choosing our [facility] Emergency Department.        Diana Mercado LPN  04/16/24 [id number]